# Patient Record
Sex: FEMALE | Race: WHITE | NOT HISPANIC OR LATINO | Employment: UNEMPLOYED | URBAN - METROPOLITAN AREA
[De-identification: names, ages, dates, MRNs, and addresses within clinical notes are randomized per-mention and may not be internally consistent; named-entity substitution may affect disease eponyms.]

---

## 2021-04-06 DIAGNOSIS — M79.672 LEFT FOOT PAIN: Primary | ICD-10-CM

## 2021-04-09 ENCOUNTER — OFFICE VISIT (OUTPATIENT)
Dept: PODIATRY | Age: 46
End: 2021-04-09
Attending: NURSE PRACTITIONER

## 2021-04-09 VITALS
HEIGHT: 67 IN | DIASTOLIC BLOOD PRESSURE: 70 MMHG | BODY MASS INDEX: 33.9 KG/M2 | WEIGHT: 216 LBS | HEART RATE: 70 BPM | SYSTOLIC BLOOD PRESSURE: 118 MMHG

## 2021-04-09 DIAGNOSIS — M79.5 FOREIGN BODY (FB) IN SOFT TISSUE: Primary | ICD-10-CM

## 2021-04-09 PROCEDURE — 99203 OFFICE O/P NEW LOW 30 MIN: CPT | Performed by: PODIATRIST

## 2021-04-09 PROCEDURE — 28190 REMOVAL OF FOOT FOREIGN BODY: CPT | Performed by: PODIATRIST

## 2021-04-09 RX ORDER — CAPSAICIN 0.025 %
CREAM (GRAM) TOPICAL PRN
COMMUNITY

## 2021-04-09 RX ORDER — TRAZODONE HYDROCHLORIDE 100 MG/1
100 TABLET ORAL
COMMUNITY
Start: 2020-12-21

## 2021-04-09 RX ORDER — ARIPIPRAZOLE 10 MG/1
10 TABLET ORAL DAILY
COMMUNITY
Start: 2020-12-21

## 2021-04-09 RX ORDER — SERTRALINE HYDROCHLORIDE 100 MG/1
150 TABLET, FILM COATED ORAL DAILY
COMMUNITY
Start: 2020-12-21

## 2021-04-09 RX ORDER — HYDROXYZINE 50 MG/1
50 TABLET, FILM COATED ORAL 3 TIMES DAILY PRN
COMMUNITY
Start: 2020-12-21

## 2021-04-09 RX ORDER — IBUPROFEN 600 MG/1
600 TABLET ORAL EVERY 6 HOURS PRN
COMMUNITY

## 2023-03-10 ENCOUNTER — HOSPITAL ENCOUNTER (INPATIENT)
Facility: HOSPITAL | Age: 48
LOS: 4 days | Discharge: NURSING, REHAB OR LONG-TERM CARE | End: 2023-03-14
Attending: INTERNAL MEDICINE | Admitting: STUDENT IN AN ORGANIZED HEALTH CARE EDUCATION/TRAINING PROGRAM
Payer: MEDICAID

## 2023-03-10 DIAGNOSIS — R45.851 SUICIDAL IDEATION: ICD-10-CM

## 2023-03-10 DIAGNOSIS — F33.2 SEVERE RECURRENT MAJOR DEPRESSION WITHOUT PSYCHOTIC FEATURES (H): Primary | ICD-10-CM

## 2023-03-10 LAB
ALBUMIN SERPL BCG-MCNC: 3.8 G/DL (ref 3.5–5.2)
ALP SERPL-CCNC: 111 U/L (ref 35–104)
ALT SERPL W P-5'-P-CCNC: 17 U/L (ref 10–35)
ANION GAP SERPL CALCULATED.3IONS-SCNC: 15 MMOL/L (ref 7–15)
AST SERPL W P-5'-P-CCNC: 21 U/L (ref 10–35)
BASOPHILS # BLD AUTO: 0.1 10E3/UL (ref 0–0.2)
BASOPHILS NFR BLD AUTO: 0 %
BILIRUB SERPL-MCNC: 0.2 MG/DL
BUN SERPL-MCNC: 18.7 MG/DL (ref 6–20)
CALCIUM SERPL-MCNC: 9.9 MG/DL (ref 8.6–10)
CHLORIDE SERPL-SCNC: 103 MMOL/L (ref 98–107)
CREAT SERPL-MCNC: 0.76 MG/DL (ref 0.51–0.95)
DEPRECATED HCO3 PLAS-SCNC: 21 MMOL/L (ref 22–29)
EOSINOPHIL # BLD AUTO: 0.4 10E3/UL (ref 0–0.7)
EOSINOPHIL NFR BLD AUTO: 3 %
ERYTHROCYTE [DISTWIDTH] IN BLOOD BY AUTOMATED COUNT: 12.7 % (ref 10–15)
ETHANOL SERPL-MCNC: <0.01 G/DL
GFR SERPL CREATININE-BSD FRML MDRD: >90 ML/MIN/1.73M2
GLUCOSE SERPL-MCNC: 103 MG/DL (ref 70–99)
HCT VFR BLD AUTO: 46.4 % (ref 35–47)
HGB BLD-MCNC: 15.5 G/DL (ref 11.7–15.7)
HOLD SPECIMEN: NORMAL
IMM GRANULOCYTES # BLD: 0.1 10E3/UL
IMM GRANULOCYTES NFR BLD: 0 %
LYMPHOCYTES # BLD AUTO: 3.4 10E3/UL (ref 0.8–5.3)
LYMPHOCYTES NFR BLD AUTO: 24 %
MCH RBC QN AUTO: 29.2 PG (ref 26.5–33)
MCHC RBC AUTO-ENTMCNC: 33.4 G/DL (ref 31.5–36.5)
MCV RBC AUTO: 88 FL (ref 78–100)
MONOCYTES # BLD AUTO: 1 10E3/UL (ref 0–1.3)
MONOCYTES NFR BLD AUTO: 7 %
NEUTROPHILS # BLD AUTO: 9.5 10E3/UL (ref 1.6–8.3)
NEUTROPHILS NFR BLD AUTO: 66 %
NRBC # BLD AUTO: 0 10E3/UL
NRBC BLD AUTO-RTO: 0 /100
PLATELET # BLD AUTO: 389 10E3/UL (ref 150–450)
POTASSIUM SERPL-SCNC: 3.9 MMOL/L (ref 3.4–5.3)
PROT SERPL-MCNC: 6.7 G/DL (ref 6.4–8.3)
RBC # BLD AUTO: 5.3 10E6/UL (ref 3.8–5.2)
SARS-COV-2 RNA RESP QL NAA+PROBE: NEGATIVE
SODIUM SERPL-SCNC: 139 MMOL/L (ref 136–145)
WBC # BLD AUTO: 14.4 10E3/UL (ref 4–11)

## 2023-03-10 PROCEDURE — 36415 COLL VENOUS BLD VENIPUNCTURE: CPT | Performed by: INTERNAL MEDICINE

## 2023-03-10 PROCEDURE — 99285 EMERGENCY DEPT VISIT HI MDM: CPT | Performed by: INTERNAL MEDICINE

## 2023-03-10 PROCEDURE — 82077 ASSAY SPEC XCP UR&BREATH IA: CPT | Performed by: INTERNAL MEDICINE

## 2023-03-10 PROCEDURE — C9803 HOPD COVID-19 SPEC COLLECT: HCPCS

## 2023-03-10 PROCEDURE — 80053 COMPREHEN METABOLIC PANEL: CPT | Performed by: INTERNAL MEDICINE

## 2023-03-10 PROCEDURE — U0005 INFEC AGEN DETEC AMPLI PROBE: HCPCS | Performed by: INTERNAL MEDICINE

## 2023-03-10 PROCEDURE — 85004 AUTOMATED DIFF WBC COUNT: CPT | Performed by: INTERNAL MEDICINE

## 2023-03-10 PROCEDURE — 99285 EMERGENCY DEPT VISIT HI MDM: CPT

## 2023-03-10 PROCEDURE — 124N000001 HC R&B MH

## 2023-03-10 RX ORDER — OLANZAPINE 10 MG/1
10 TABLET ORAL 3 TIMES DAILY PRN
Status: DISCONTINUED | OUTPATIENT
Start: 2023-03-10 | End: 2023-03-14 | Stop reason: HOSPADM

## 2023-03-10 RX ORDER — OLANZAPINE 10 MG/2ML
10 INJECTION, POWDER, FOR SOLUTION INTRAMUSCULAR 3 TIMES DAILY PRN
Status: DISCONTINUED | OUTPATIENT
Start: 2023-03-10 | End: 2023-03-14 | Stop reason: HOSPADM

## 2023-03-10 RX ORDER — ACETAMINOPHEN 325 MG/1
650 TABLET ORAL EVERY 4 HOURS PRN
Status: DISCONTINUED | OUTPATIENT
Start: 2023-03-10 | End: 2023-03-14 | Stop reason: HOSPADM

## 2023-03-10 RX ORDER — MAGNESIUM HYDROXIDE/ALUMINUM HYDROXICE/SIMETHICONE 120; 1200; 1200 MG/30ML; MG/30ML; MG/30ML
30 SUSPENSION ORAL EVERY 4 HOURS PRN
Status: DISCONTINUED | OUTPATIENT
Start: 2023-03-10 | End: 2023-03-14 | Stop reason: HOSPADM

## 2023-03-10 RX ORDER — HYDROXYZINE HYDROCHLORIDE 25 MG/1
25 TABLET, FILM COATED ORAL EVERY 4 HOURS PRN
Status: DISCONTINUED | OUTPATIENT
Start: 2023-03-10 | End: 2023-03-14 | Stop reason: HOSPADM

## 2023-03-10 ASSESSMENT — COLUMBIA-SUICIDE SEVERITY RATING SCALE - C-SSRS
2. HAVE YOU ACTUALLY HAD ANY THOUGHTS OF KILLING YOURSELF?: YES
1. IN THE PAST MONTH, HAVE YOU WISHED YOU WERE DEAD OR WISHED YOU COULD GO TO SLEEP AND NOT WAKE UP?: YES
REASONS FOR IDEATION PAST MONTH: COMPLETELY TO END OR STOP THE PAIN (YOU COULDN'T GO ON LIVING WITH THE PAIN OR HOW YOU WERE FEELING)
FIRST ATTEMPT DATE: 61362
2. HAVE YOU ACTUALLY HAD ANY THOUGHTS OF KILLING YOURSELF?: YES
3. HAVE YOU BEEN THINKING ABOUT HOW YOU MIGHT KILL YOURSELF?: YES
4. HAVE YOU HAD THESE THOUGHTS AND HAD SOME INTENTION OF ACTING ON THEM?: YES
6. HAVE YOU EVER DONE ANYTHING, STARTED TO DO ANYTHING, OR PREPARED TO DO ANYTHING TO END YOUR LIFE?: YES
TOTAL  NUMBER OF ACTUAL ATTEMPTS LIFETIME: 3
5. HAVE YOU STARTED TO WORK OUT OR WORKED OUT THE DETAILS OF HOW TO KILL YOURSELF? DO YOU INTEND TO CARRY OUT THIS PLAN?: YES
5. HAVE YOU STARTED TO WORK OUT OR WORKED OUT THE DETAILS OF HOW TO KILL YOURSELF? DO YOU INTEND TO CARRY OUT THIS PLAN?: YES
1. IN YOUR LIFETIME, HAVE YOU WISHED YOU WERE DEAD OR WISHED YOU COULD GO TO SLEEP AND NOT WAKE UP?: Y
4. HAVE YOU HAD THESE THOUGHTS AND HAD SOME INTENTION OF ACTING ON THEM?: YES
1. HAVE YOU WISHED YOU WERE DEAD OR WISHED YOU COULD GO TO SLEEP AND NOT WAKE UP?: YES
MOST RECENT DATE: 65714
ATTEMPT LIFETIME: YES
ATTEMPT PAST THREE MONTHS: NO

## 2023-03-10 ASSESSMENT — ACTIVITIES OF DAILY LIVING (ADL)
ADLS_ACUITY_SCORE: 35
WALKING_OR_CLIMBING_STAIRS_DIFFICULTY: NO
DIFFICULTY_EATING/SWALLOWING: NO
DRESSING/BATHING_DIFFICULTY: NO
CHANGE_IN_FUNCTIONAL_STATUS_SINCE_ONSET_OF_CURRENT_ILLNESS/INJURY: NO
WEAR_GLASSES_OR_BLIND: NO
DOING_ERRANDS_INDEPENDENTLY_DIFFICULTY: NO
TOILETING_ISSUES: NO
FALL_HISTORY_WITHIN_LAST_SIX_MONTHS: NO
ADLS_ACUITY_SCORE: 28
CONCENTRATING,_REMEMBERING_OR_MAKING_DECISIONS_DIFFICULTY: NO

## 2023-03-11 LAB
AMPHETAMINES UR QL: NOT DETECTED
BARBITURATES UR QL SCN: NOT DETECTED
BENZODIAZ UR QL SCN: NOT DETECTED
BUPRENORPHINE UR QL: NOT DETECTED
CANNABINOIDS UR QL: NOT DETECTED
COCAINE UR QL SCN: NOT DETECTED
D-METHAMPHET UR QL: DETECTED
METHADONE UR QL SCN: NOT DETECTED
OPIATES UR QL SCN: NOT DETECTED
OXYCODONE UR QL SCN: NOT DETECTED
PCP UR QL SCN: NOT DETECTED
PROPOXYPH UR QL: NOT DETECTED
TRICYCLICS UR QL SCN: NOT DETECTED

## 2023-03-11 PROCEDURE — 99223 1ST HOSP IP/OBS HIGH 75: CPT | Mod: AI | Performed by: PSYCHIATRY & NEUROLOGY

## 2023-03-11 PROCEDURE — 80306 DRUG TEST PRSMV INSTRMNT: CPT | Performed by: INTERNAL MEDICINE

## 2023-03-11 PROCEDURE — 124N000001 HC R&B MH

## 2023-03-11 RX ORDER — SUMATRIPTAN 50 MG/1
100 TABLET, FILM COATED ORAL
COMMUNITY
Start: 2022-06-26

## 2023-03-11 RX ORDER — METOPROLOL SUCCINATE 50 MG/1
50 TABLET, EXTENDED RELEASE ORAL DAILY
Status: DISCONTINUED | OUTPATIENT
Start: 2023-03-12 | End: 2023-03-14 | Stop reason: HOSPADM

## 2023-03-11 RX ORDER — ARIPIPRAZOLE 10 MG/1
10 TABLET ORAL DAILY
Status: ON HOLD | COMMUNITY
Start: 2022-06-26 | End: 2023-03-14

## 2023-03-11 RX ORDER — METOPROLOL SUCCINATE 50 MG/1
50 TABLET, EXTENDED RELEASE ORAL DAILY
Status: ON HOLD | COMMUNITY
End: 2023-03-14

## 2023-03-11 RX ORDER — TRIAMCINOLONE ACETONIDE 5 MG/G
OINTMENT TOPICAL 2 TIMES DAILY PRN
Status: ON HOLD | COMMUNITY
End: 2023-03-13

## 2023-03-11 ASSESSMENT — ENCOUNTER SYMPTOMS
BACK PAIN: 0
HEADACHES: 0
NECK STIFFNESS: 0
WHEEZING: 0
DIZZINESS: 0
DYSPHORIC MOOD: 1
SHORTNESS OF BREATH: 0
NECK PAIN: 0
DIAPHORESIS: 0
VOICE CHANGE: 0
CONFUSION: 0
DYSURIA: 0
VOMITING: 0
MYALGIAS: 0
ABDOMINAL DISTENTION: 0
ABDOMINAL PAIN: 0
NUMBNESS: 0
BLOOD IN STOOL: 0
HEMATURIA: 0
FEVER: 0
ANAL BLEEDING: 0
COLOR CHANGE: 0
DEPRESSED MOOD: 1
SLEEP DISTURBANCE: 1
PALPITATIONS: 0
CHILLS: 0
CHEST TIGHTNESS: 0
COUGH: 0
ARTHRALGIAS: 0
NAUSEA: 0
FLANK PAIN: 0
WOUND: 0
LIGHT-HEADEDNESS: 0

## 2023-03-11 ASSESSMENT — ACTIVITIES OF DAILY LIVING (ADL)
ADLS_ACUITY_SCORE: 28

## 2023-03-11 NOTE — PLAN OF CARE
"Lyssa MEMBRENO Jama  March 10, 2023  Plan of Care Hand-off Note     Patient Care Path: Inpatient Mental Health    Plan for Care:     Pt presents to ED following argument with brother.  Pt displaying erratic behavior including yelling through day and night, throwing items, talking incoherently and complaining of \"crazy voices\" in her head the past 6 days while staying at brother's home.  Pt living in [facility] in [place] and is a heavy meth user.  Reported last use was about 6 days ago.  Pt has been off of medication for 3 weeks.  Pt endorses current SI with plan and intent.   Pt is unwilling to share plan for suicide.  Guarded and irritable.  Unable or unwilling to engage in safety planning.  Pt with history of suicide attempts.  Pt recommended for inpatient hospitalization for safety and stabilization.  ER MD consulted and supports recommendation.  MD will refer to inpatient bed available at [facility].  Clinical Intake contacted by .      Critical Safety Issues: Endorsing current suicidal ideation with plan and uncertain intent.  Pt has hx of multiple suicide attempts via overdose.  Pt displaying erratic behavior over past week with family and may be experiencing internal stimuli.      Overview:  This patient is a child/adolescent: No    This patient has additional special visitor precautions: No    Legal Status: 70 HH expiring      Contacts:   Az (brother), [phone]: Contact      Psychiatry Consult:  Adult Psychiatry Consult requested related to agitation. Psychiatry IP Consult Order Placed: No      Updated Attending Provider regarding plan of care.    Yaa Cerda      "

## 2023-03-11 NOTE — CONSULTS
"Diagnostic Evaluation Consultation  Crisis Assessment    Patient was assessed: [facility]  Patient location: [place] ED  Was a release of information signed: No. Reason: pt declined      Referral Data and Chief Complaint  Lyssa Yun is a 48 year old, who uses she/her pronouns, and presents to the ED via police. Patient is referred to the ED by family/friends. Patient is presenting to the ED for the following concerns: suicidal ideation.  Pt presents to the ED after brother called 911.  Pt has been staying at brother's home since Sunday.  Pt's behavior has been erratic, last use of meth on weekend.  Pt screaming, yelling, complaining at times of \"crazy voices\" in her head.  Brother states pt will scream all night.  Has been stating she wants to be dead and today told him she was going to kill herself.  When pt said she was having friends bring her drugs, brother called 911.     Informed Consent and Assessment Methods     Patient is her own guardian. Writer met with patient and explained the crisis assessment process, including applicable information disclosures and limits to confidentiality, assessed understanding of the process, and obtained consent to proceed with the assessment. Patient was observed to be able to participate in the assessment as evidenced by responding to questions.. Assessment methods included conducting a formal interview with patient, review of medical records, collaboration with medical staff, and obtaining relevant collateral information from family and community providers when available..     Over the course of this crisis assessment offered validation, engaged patient in problem solving and disposition planning and worked with patient on safety and aftercare planning. Patient's response to interventions was agitated, guarded.     Summary of Patient Situation    Pt presents to the ED after brother called 911. Brother went to [place] where pt has been living out of her non-running van using " meth.  Most of pt's family is in [place] and siblings have attempted multiple times to intervene with pt and provide help.  Pt brought to brother's house on Sunday.  Pt was high on meth.  Brother believes pt's last use was on the weekend.  Pt's behavior has been erratic with screaming and throwing things, saying she has crazy voices in her head.   Pt will scream all night.  Pt repeatedly stating she wishes to be dead.      Pt is interviewed laying down on hospital bed.  She engages in eye contact for brief moments. Pt is oriented to day and month, person and place.  Pt is quickly agitated, yells and speaks sarcastically.  Pt admits to current suicidal ideation with a plan.  Pt refuses to state what her plan is.  Pt has been off of her medications for 3 weeks, stating her van is broken and she has no way of getting anywhere.  Pt endorses depressed mood, hopelessness and helplessness, feelings of worthlessness.   Denies hearing voices currently.   Pt sees 10 year old daughter once a week through supervised visitation.  Only reason pt states for continued living.  Pt suffered significant losses in past year and half including death of mother and girlfriend and suicide of fiance.  Pt reports she has tried treatment and been hospitalized and nothing works.      Brief Psychosocial History  Pt has been living in broken van that does not run, using methamphetamine.  Pt is homeless and does not work.  Pt graduated high school.  She does not have custody of her child and sees her 10 year old daughter once a week with supervision.  Family history is positive for drug and alcohol use in siblings.     Significant Clinical History  Pt has history of depression, anxiety and methamphetamine induced psychosis.  Pt reports hx of inpatient hospitalizations and chemical dependency treatment.  Pt has suicide attempts by drug overdose in 2009, 2016, 2019 and 12/2020.  Pt reports 13 attempts.   Review of records indicates hx of  "commitment through [facility] in [place] in 2021.        Collateral Information  The following information was received from Az whose relationship to the patient is brother. Information was obtained via phone. Their phone number is [phone] and they last had contact with patient on today.    What happened today: Continued days of erratic behavior, screaming and throwing things.  Pt threatening to kill herself \"all week\", last time was this afternoon.  Pt also saying she was going to have friends bring her drugs while staying at brother's house.  Brother called 911.      What is different about patient's functioning: Increased agitation, behavioral acting out    Concern about alcohol/drug use: Yes drug use    What do you think the patient needs: inpatient and chemical health treatment    Has patient made comments about wanting to kill themselves/others:  Yes all week - verbally and texting    If d/c is recommended, can they take part in safety/aftercare planning: Yes to some extent    Other information: family has attempted to help pt many times     Risk Assessment  [place] Suicide Severity Rating Scale Full Clinical Version: 3/10/23  Suicidal Ideation  1. Wish to be Dead (Lifetime): Yes  Wish to be Dead Description (Lifetime): y  1. Wish to be Dead (Past 1 Month): Yes  2. Non-Specific Active Suicidal Thoughts (Lifetime): Yes  2. Non-Specific Active Suicidal Thoughts (Past 1 Month): Yes  3. Active Suicidal Ideation with any Methods (Not Plan) Without Intent to Act (Lifetime): Yes  Active Suicidal Ideation with any Methods (Not Plan) Description (Lifetime): y  3. Active Suicidal Ideation with any Methods (Not Plan) Without Intent to Act (Past 1 Month): Yes  4. Active Suicidal Ideation with Some Intent to Act, Without Specific Plan (Lifetime): Yes  Active Suicidal Ideation with Some Intent to Act, Without Specific Plan Description (Lifetime): y  4. Active Suicidal Ideation with Some Intent to Act, Without " Specific Plan (Past 1 Month): Yes  5. Active Suicidal Ideation with Specific Plan and Intent (Lifetime): Yes  Active Suicidal Ideation with Specific Plan and Intent Description (Lifetime): overdose  5. Active Suicidal Ideation with Specific Plan and Intent (Past 1 Month): Yes  Intensity of Ideation  Description of Most Severe Ideation (Lifetime): overdose  Frequency (Lifetime): Daily or almost daily  Frequency (Past 1 Month): Daily or almost daily  Deterrents (Lifetime): Uncertain that deterrents stopped you  Deterrents (Past 1 Month): Deterrents probably stopped you  Reasons for Ideation (Past 1 Month): Completely to end or stop the pain (You couldn't go on living with the pain or how you were feeling)  Suicidal Behavior  Actual Attempt (Lifetime): Yes  Total Number of Actual Attempts (Lifetime): 3  Actual Attempt Description (Lifetime): overdose  Actual Attempt (Past 3 Months): No  Has subject engaged in non-suicidal self-injurious behavior? (Lifetime): No  C-SSRS Risk (Lifetime/Recent)  Calculated C-SSRS Risk Score (Lifetime/Recent): High Risk    [place] Suicide Severity Rating Scale Since Last Contact:     Validity of evaluation is impacted by presenting factors during interview:  Agitated, rushing through answers or refusing to answer, guarded.   Comments regarding subjective versus objective responses to [place] tool: Pt stating she is suicidal, collateral matches  Environmental or Psychosocial Events: legal issues such as DWI, DUI, lawsuit, CPS involvement, etc., suicide bereavement, loss of a loved one, challenging interpersonal relationships, helplessness/hopelessness, unstable housing, homelessness, excessive debt, poor finances and ongoing abuse of substances  Chronic Risk Factors: history of suicide attempts (multiple) and history of psychiatric hospitalization   Warning Signs: talking or writing about death, dying, or suicide, hopelessness and recent discharges from emergency department or inpatient  psychiatric care  Protective Factors: responsibilities and duties to others, including pets and children  Interpretation of Risk Scoring, Risk Mitigation Interventions and Safety Plan:  Pt is agitated and guarded with responses.  Pt admits to methamphetamine use and constant suicidal ideation.  Pt admits to current plan but declines to divulge.  It is unclear if pt is experiencing any auditory hallucinations.  Pt expressing hopelessness and helplessness.  Pt is off medication, not connected to providers, estranged by family and homeless.  It is recommendation of this clinician that pt admit to [facility] for safety and stabilization.  Pt is unable or unwilling to engage in safety planning at this time.  Once out of inpatient, pt should be assessed for chemical dependency treatment recommendations.    Does the patient have thoughts of harming others? Yes.  Does the patient have a specific victim in mind? Yes brother Do they have a plan? No Do they have intent? No Is this a duty to warn situation?  no  Pt has recently threatened brother, however, this was in response to brother not allowing pt to have drugs in his home.     Is the patient engaging in sexually inappropriate behavior?  no        Current Substance Abuse     Is there recent substance abuse? methamphetamine.  reports last use 6 days ago.     Was a urine drug screen or blood alcohol level obtained: Yes pending       Mental Status Exam     Affect: Labile   Appearance: Disheveled    Attention Span/Concentration: Attentive  Eye Contact: Variable   Fund of Knowledge: Appropriate    Language /Speech Content: Fluent   Language /Speech Volume: Loud    Language /Speech Rate/Productions: Pressured    Recent Memory: Variable   Remote Memory: Variable   Mood: Irritable    Orientation to Person: Yes    Orientation to Place: Yes   Orientation to Time of Day: No    Orientation to Date: Yes    Situation (Do they understand why they are here?): Yes    Psychomotor  Behavior: Agitated    Thought Content: Suicidal   Thought Form: Intact      History of commitment: Yes 2021     Medication    Psychotropic medications: No current medications but a history of Abilify, Hydroxyzine, Sertraline.  Medication changes made in the last two weeks: No       Current Care Team    Primary Care Provider: No  Psychiatrist: No  Therapist: No  : No     CTSS or ARMHS: No  ACT Team: No  Other: No      Diagnosis    296.30 (F33.9) Major Depressive Disorder, Recurrent Episode, Unspecified _  300.00 (F41.9) Unspecified Anxiety Disorder  Substance-Related & Addictive Disorders Stimulant Use Disorder:  The ICD-10-CM code indicates the comorbid presence of a moderate or severe substance use disorder, which must be present in ordre to apply the code for substance wtihdrawal  , Specify current severity:  Severe  304.40 (F15.20) Severe, Amphetamine type substance   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  Pt guarded and irritable with interview.  Pt admits to current suicidal ideation and notes it is constant.  Pt admits to plan and intent but will not disclose plan.  Pt expressing hopelessness and helplessness.  Pt unable to safety plan, is off medication and is homeless.   Pt currently deemed a risk to self and recommended for inpatient mental health for safety and stabilization.  ER MD consulted and supports recommendation.    Admission to Inpatient Level of Care is indicated due to:    1. Patient risk of severity of behavioral health disorder is appropriate to proposed level of care as indicated by:    Imminent Risk of Harm: Command auditory hallucinations or paranoid delusions contributing to  risk of suicide or self harm and Current plan for suicide or serious harm to self is present  And/or:  Behavioral health disorder is present and appropriate for inpatient care with both of the following:     Severe psychiatric, behavioral or other comorbid conditions are appropriate for management at inpatient mental health as indicated by at least one of the following:   o Comorbid substance use disorder    Severe dysfunction in daily living is present as indicated by at least one of the following:   o Complete inability to maintain any appropriate aspect of personal responsibility in any adult roles    2. Inpatient mental health services are necessary to meet patient needs and at least one of the following:  Specific condition related to admission diagnosis is present and judged likely to deteriorate in absence of treatment at proposed level of care    3. Situation and expectations are appropriate for inpatient care, as indicated by one of the following:   Patient management/treatment at lower level of care is not feasible or is inappropriate    Disposition    Recommended disposition: Inpatient Mental Health       Reviewed case and recommendations with attending provider. Attending Name: Dr Leonard       Attending concurs with disposition: Yes       Patient and/or validated legal guardian concurs with disposition: Yes       Final disposition: Inpatient mental health .     Inpatient Details (if applicable):   Is patient admitted voluntarily:No, 72 hr hold. Hold start date/time: 3/10/23      Patient aware of potential for transfer if there is not appropriate placement? Yes       Patient is willing to travel outside of the [facility] for placement? Yes      Behavioral Intake Notified? Yes: Date: 3/10/23 Time: 9:30 pm.       Assessment Details    Patient interview started at: 8:10p and completed at: 8:50p.     Total duration spent on the patient case in minutes: 1.0 hrs      CPT code(s) utilized: 57174 - Psychotherapy for  Crisis - 60 (30-74*) min       Yaa Cerda, ARACELI, [facility], Psychotherapist  [facility] - Triage & Transition Services  Callback: [phone]

## 2023-03-11 NOTE — ED TRIAGE NOTES
"Pt was brought in by PD. Pt told PD that \"it would be better if I wasn't here\". PD placed her on a 72 hr hold fore safety. Pt denies suicidal ideation, homicidal ideation, pain, or any acute distress.      Triage Assessment     Row Name 03/10/23 1925       Triage Assessment (Adult)    Airway WDL WDL       Respiratory WDL    Respiratory WDL WDL       Skin Circulation/Temperature WDL    Skin Circulation/Temperature WDL WDL       Cardiac WDL    Cardiac WDL WDL       Peripheral/Neurovascular WDL    Peripheral Neurovascular WDL WDL       Cognitive/Neuro/Behavioral WDL    Cognitive/Neuro/Behavioral WDL X    Level of Consciousness other (see comments)  possible suicidal ideation    Arousal Level opens eyes spontaneously    Orientation oriented x 4    Mood/Behavior flat affect       Ami Coma Scale    Best Eye Response 4-->(E4) spontaneous    Best Motor Response 6-->(M6) obeys commands    Best Verbal Response 5-->(V5) oriented    Ami Coma Scale Score 15              "

## 2023-03-11 NOTE — PROGRESS NOTES
03/10/23 0048   Patient Belongings   Did you bring any home meds/supplements to the hospital?  Yes   Patient Belongings locker   Patient Belongings Remaining with Patient shoes   Patient Belongings Put in Hospital Secure Location (Security or Locker, etc.) other (see comments)   Belongings Search No belongings   Clothing Search Yes   Second Staff Sharron   Comment flower print pants, blue top, gray bra, gray socks, brown boots, black jkt,     List items sent to safe:[place] drivers license, bracelet, forward health card, 2 net spend visa debit, [place] quest card, [employer] play card, 2 receipts.  All other belongings put in assigned cubby in belongings room.       I have reviewed my belongings list on admission and verify that it is correct.     Patient signature_______________________________    Second staff witness (if patient unable to sign) ______________________________       I have received all my belongings at discharge.    Patient signature________________________________    Lucas   3/10/2023  11:28 PM

## 2023-03-11 NOTE — PLAN OF CARE
"ADMISSION NOTE    Reason for admission SI  Safety concerns None  Risk for or history of violence None   Full skin assessment: clear.     Patient arrived on unit from [facility] ED accompanied by staff and security on 3/10/2023  10:45 PM.   Status on arrival: calm and alert.  /85   Pulse 66   Temp 98.6  F (37  C) (Temporal)   Resp 14   Ht 1.676 m (5' 6\")   Wt 116 kg (255 lb 11.2 oz)   SpO2 93%   BMI 41.27 kg/m    Patient given tour of unit and Welcome to  unit papers given to patient, wanding completed, belongings inventoried, and admission assessment completed.   Patient's legal status on arrival is voluntary. Appropriate legal rights discussed with and copy given to patient. Patient Bill of Rights discussed with and copy given to patient. Patient denies SI, HI, and thoughts of self harm and contracts for safety while on unit.        Pt denies SI/HI, AH/VH,and pain. Pt endorses anxiety and depression.  Pt is VSS except for SBP elevated.  Pt given snack and juice. Pt has a flat affect. Pt is calm and alert. Pt is using appropriate behavior with staff and peers.   Pt brought in by PD after she called PD. Pt was arguing with her brother and sending him texts that she would be \"better off not being here\". Pt is living out of her van that is at her brother's place.     Alicia Dejesus RN  3/10/2023              "

## 2023-03-11 NOTE — PLAN OF CARE
"Face to face shift report received from Ynes SANCHEZ. Rounding completed, pt observed laying in bed at the start of the shift.    Patient withdrawn to room all day laying in bed. Alert and making needs known. Ate 100% of breakfast and lunch. States she cannot come and get her lunch trays due to \"horrible social anxiety.\" Calm during conversation with this writer while dismissive at times. Compliant with nursing assessment. Denies anxiety, depression, hallucinations, SI/HI, and pain. States she isn't feeling well and reports feeling overheated. No other symptoms reported. Urinalysis completed and sent to lab. Patient heard from nurse's station yelling in her room while speaking with provider on the Ipad. When approached by nursing staff she stated \"Get this fucking thing out of here. I'm sick of them always saying the same thing.\" Directed patient to room in [facility] at this time with no problems. No further yelling or behaviors towards nursing staff. Offered pharmacologic intervention at this time and patient declined. Ate lunch and remained in bed for the rest of the afternoon.    Problem: Adult Behavioral Health Plan of Care  Goal: Patient-Specific Goal (Individualization)  Description: Patient will sleep 6 to 8 hours per night  Patient will eat at least 50% of meals  Patient will attend at least 50% of groups  Patient will comply with recommendations of treatment team  Patient will remain medication compliant      Outcome: Progressing     Problem: Suicide Risk  Goal: Absence of Self-Harm  Description: Pt will be free of self harm while on unit.   Outcome: Progressing    Face to face report will be communicated to oncrossana SANCHEZ.    Bessy Watts RN  3/11/2023                        "

## 2023-03-11 NOTE — PLAN OF CARE
Problem: Adult Behavioral Health Plan of Care  Goal: Patient-Specific Goal (Individualization)  Description: Patient will sleep 6 to 8 hours per night  Patient will eat at least 50% of meals  Patient will attend at least 50% of groups  Patient will comply with recommendations of treatment team  Patient will remain medication compliant  Outcome: Progressing     Problem: Suicide Risk  Goal: Absence of Self-Harm  Description: Pt will be free of self harm while on unit.   Outcome: Progressing     Face to face shift report received from Charisse Ocampo completed, pt observed laying in bed, appeared to be sleeping.    Patient appeared to be sleeping for approximately 7 hours since 2300.    Patient had no reported or observed suicidal behavior or self harm this shift.      Patient's morning vitals as follows; Blood Pressure 107/64  Temp 97 Pulse 65 Respirations 16 SpO2 96% RA    Face to face report will be communicated to oncoming RN.    Ynes Blackwell RN  3/11/2023  6:29 AM

## 2023-03-11 NOTE — ED NOTES
"Pt brought in by PD. Pt states she called the PD because she was arguing with her brother. Pt states that the brother has been receiving texts from pt that state she feels she would be \"better off not being here\". PD placed her on a 72 hr hold and she is in ED 08. Pt denies suicidal ideation or homicidal ideation. She states she feels bored and does not know where to go as the van she lives out of is at her brother house. Pt denies pain, SOB, chest pain, nausea or any acute distress. Pt has been notified of the hold and the plan to have her evaluated.   "

## 2023-03-11 NOTE — ED PROVIDER NOTES
History     Chief Complaint   Patient presents with     Mental Health Problem     The history is provided by the patient.   Mental Health Problem  Presenting symptoms: depression and suicidal thoughts    Degree of incapacity (severity):  Moderate  Onset quality:  Gradual  Timing:  Intermittent  Progression:  Worsening  Chronicity:  Recurrent  Associated symptoms: no abdominal pain, no chest pain and no headaches        Allergies:  Allergies   Allergen Reactions     Ciprofloxacin Hives     Morphine Hives     Hives and itching.     Sulfa Drugs Rash       Problem List:    Patient Active Problem List    Diagnosis Date Noted     Suicidal ideation 03/10/2023     Priority: Medium        Past Medical History:    No past medical history on file.    Past Surgical History:    No past surgical history on file.    Family History:    No family history on file.    Social History:  Marital Status:  Legally  [3]        Medications:    No current outpatient medications on file.        Review of Systems   Constitutional: Negative for chills, diaphoresis and fever.   HENT: Negative for voice change.    Eyes: Negative for visual disturbance.   Respiratory: Negative for cough, chest tightness, shortness of breath and wheezing.    Cardiovascular: Negative for chest pain, palpitations and leg swelling.   Gastrointestinal: Negative for abdominal distention, abdominal pain, anal bleeding, blood in stool, nausea and vomiting.   Genitourinary: Negative for decreased urine volume, dysuria, flank pain and hematuria.   Musculoskeletal: Negative for arthralgias, back pain, gait problem, myalgias, neck pain and neck stiffness.   Skin: Negative for color change, pallor, rash and wound.   Neurological: Negative for dizziness, syncope, light-headedness, numbness and headaches.   Psychiatric/Behavioral: Positive for behavioral problems, dysphoric mood, sleep disturbance and suicidal ideas. Negative for confusion.       Physical Exam   BP:  "(!) 156/116  Pulse: 88  Temp: 99.1  F (37.3  C)  Resp: 18  Height: 167.6 cm (5' 6\")  Weight: 104.3 kg (230 lb)  SpO2: 93 %      Physical Exam  Vitals and nursing note reviewed.   Constitutional:       Appearance: She is well-developed.   HENT:      Head: Normocephalic and atraumatic.      Mouth/Throat:      Pharynx: No oropharyngeal exudate.   Eyes:      Conjunctiva/sclera: Conjunctivae normal.      Pupils: Pupils are equal, round, and reactive to light.   Neck:      Thyroid: No thyromegaly.      Vascular: No JVD.      Trachea: No tracheal deviation.   Cardiovascular:      Rate and Rhythm: Normal rate and regular rhythm.      Heart sounds: Normal heart sounds. No murmur heard.    No friction rub. No gallop.   Pulmonary:      Effort: Pulmonary effort is normal. No respiratory distress.      Breath sounds: Normal breath sounds. No stridor. No wheezing or rales.   Chest:      Chest wall: No tenderness.   Abdominal:      General: Bowel sounds are normal. There is no distension.      Palpations: Abdomen is soft. There is no mass.      Tenderness: There is no abdominal tenderness. There is no guarding or rebound.   Musculoskeletal:         General: No tenderness. Normal range of motion.      Cervical back: Normal range of motion and neck supple.   Lymphadenopathy:      Cervical: No cervical adenopathy.   Skin:     General: Skin is warm and dry.      Coloration: Skin is not pale.      Findings: No erythema or rash.   Neurological:      Mental Status: She is alert and oriented to person, place, and time.   Psychiatric:         Attention and Perception: Attention normal.         Mood and Affect: Mood is depressed.         Behavior: Behavior is slowed.         Thought Content: Thought content includes suicidal ideation. Thought content does not include suicidal plan.         ED Course                 Procedures                Results for orders placed or performed during the hospital encounter of 03/10/23 (from the past 24 " hour(s))   Asymptomatic COVID-19 Virus (Coronavirus) by PCR Nasopharyngeal    Specimen: Nasopharyngeal; Swab   Result Value Ref Range    SARS CoV2 PCR Negative Negative    Narrative    Testing was performed using the [employer] Xpress SARS-CoV-2 Assay on the Cepheid Gene-Xpert Instrument Systems. Additional information about this Emergency Use Authorization (EUA) assay can be found via the Lab Guide. This test should be ordered for the detection of SARS-CoV-2 in individuals who meet SARS-CoV-2 clinical and/or epidemiological criteria as well as from individuals without symptoms or other reasons to suspect COVID-19. Test performance for asymptomatic patients has only been established in anterior nasal swab specimens. This test is for in vitro diagnostic use under the FDA EUA for laboratories certified under CLIA to perform high complexity testing. This test has not been FDA cleared or approved. A negative result does not rule out the presence of PCR inhibitors in the specimen or target RNA concentration below the limit of detection for the assay. The possibility of a false negative should be considered if the patient's recent exposure or clinical presentation suggests COVID-19. This test was validated by [facility] laboratory. This laboratory is certified under the Clinical Laboratory Improvement Amendments (CLIA) as qualified to perform high complexity testing.   Comprehensive metabolic panel   Result Value Ref Range    Sodium 139 136 - 145 mmol/L    Potassium 3.9 3.4 - 5.3 mmol/L    Chloride 103 98 - 107 mmol/L    Carbon Dioxide (CO2) 21 (L) 22 - 29 mmol/L    Anion Gap 15 7 - 15 mmol/L    Urea Nitrogen 18.7 6.0 - 20.0 mg/dL    Creatinine 0.76 0.51 - 0.95 mg/dL    Calcium 9.9 8.6 - 10.0 mg/dL    Glucose 103 (H) 70 - 99 mg/dL    Alkaline Phosphatase 111 (H) 35 - 104 U/L    AST 21 10 - 35 U/L    ALT 17 10 - 35 U/L    Protein Total 6.7 6.4 - 8.3 g/dL    Albumin 3.8 3.5 - 5.2 g/dL    Bilirubin Total 0.2  <=1.2 mg/dL    GFR Estimate >90 >60 mL/min/1.73m2   CBC with Platelets & Differential    Narrative    The following orders were created for panel order CBC with Platelets & Differential.  Procedure                               Abnormality         Status                     ---------                               -----------         ------                     CBC with platelets and d...[[id number]]  Abnormal            Final result                 Please view results for these tests on the individual orders.   Ethyl Alcohol Level   Result Value Ref Range    Alcohol ethyl <0.01 <=0.01 g/dL   CBC with platelets and differential   Result Value Ref Range    WBC Count 14.4 (H) 4.0 - 11.0 10e3/uL    RBC Count 5.30 (H) 3.80 - 5.20 10e6/uL    Hemoglobin 15.5 11.7 - 15.7 g/dL    Hematocrit 46.4 35.0 - 47.0 %    MCV 88 78 - 100 fL    MCH 29.2 26.5 - 33.0 pg    MCHC 33.4 31.5 - 36.5 g/dL    RDW 12.7 10.0 - 15.0 %    Platelet Count 389 150 - 450 10e3/uL    % Neutrophils 66 %    % Lymphocytes 24 %    % Monocytes 7 %    % Eosinophils 3 %    % Basophils 0 %    % Immature Granulocytes 0 %    NRBCs per 100 WBC 0 <1 /100    Absolute Neutrophils 9.5 (H) 1.6 - 8.3 10e3/uL    Absolute Lymphocytes 3.4 0.8 - 5.3 10e3/uL    Absolute Monocytes 1.0 0.0 - 1.3 10e3/uL    Absolute Eosinophils 0.4 0.0 - 0.7 10e3/uL    Absolute Basophils 0.1 0.0 - 0.2 10e3/uL    Absolute Immature Granulocytes 0.1 <=0.4 10e3/uL    Absolute NRBCs 0.0 10e3/uL   Extra Tube    Narrative    The following orders were created for panel order Extra Tube.  Procedure                               Abnormality         Status                     ---------                               -----------         ------                     Extra Blue Top Tube[[id number]]                              Final result               Extra Red Top Tube[[id number]]                               Final result               Extra Heparinized Syringe[[id number]]                        Final result                  Please view results for these tests on the individual orders.   Extra Blue Top Tube   Result Value Ref Range    Hold Specimen JIC    Extra Red Top Tube   Result Value Ref Range    Hold Specimen JIC    Extra Heparinized Syringe   Result Value Ref Range    Hold Specimen JIC        Medications   acetaminophen (TYLENOL) tablet 650 mg (has no administration in time range)   alum & mag hydroxide-simethicone (MAALOX) suspension 30 mL (has no administration in time range)   melatonin tablet 5 mg (has no administration in time range)   hydrOXYzine (ATARAX) tablet 25 mg (has no administration in time range)   OLANZapine (zyPREXA) tablet 10 mg (has no administration in time range)     Or   OLANZapine (zyPREXA) injection 10 mg (has no administration in time range)       Assessments & Plan (with Medical Decision Making)   Brought by PD for suicidal comments , as her brother reported  Admitted that she is Suicidal i, admitted to methamphatmine abuse too    DEC assessemtn appreciated  Labs reviewed  Spoke to Madelin  Provider , accepted for admission.   I have reviewed the nursing notes.    I have reviewed the findings, diagnosis, plan and need for follow up with the patient.  .        Current Discharge Medication List          Final diagnoses:   Suicidal ideation       3/10/2023   [facility]     Gregory Leonard MD  03/11/23 3656

## 2023-03-11 NOTE — H&P
"  [facility] PSYCH[facility]ATRY  -  HISTORY AND PHYSICAL     ADMISSION DATA     Lyssa Yun MRN# [id number]   Age: 48 year old YOB: 1975     Date of Admission: 3/10/2023  Primary Physician: No primary care provider on file.   Referral Area: Referral Area: [facility] Emergency Department       CHIEF COMPLAINT   Reason for Admit/Consult: Suicidal ideation or attempt / self harm and Psychosis / jamari symptoms       HISTORY OF PRESENT ILLNESS   Lyssa Yun is a 48 year old female with an unknown past psychiatric history. Appears to have received some of her care through [place] where she has received commitment in the past.  PTA medications indicate she may be on aripiprazole and fluoxetine.  She presents to our emergency department at the recommendation of her brother who she has briefly been living. Reportedly she has been abusing methamphetamine resulting in worsening auditory hallucinations and threatening to end her life. Patient was subsequently transferred and accepted for inpatient psychiatric hospitalization at [facility] for further safety and further stabilization.     Prior to seeing the patient, I had the opportunity to review the medical record. Per DEC assessment in emergency department, \"Lyssa Yun is a 48 year old, who uses she/her pronouns, and presents to the ED via police. Patient is referred to the ED by family/friends. Patient is presenting to the ED for the following concerns: suicidal ideation.  Pt presents to the ED after brother called 911.  Pt has been staying at brother's home since Sunday.  Pt's behavior has been erratic, last use of meth on weekend.  Pt screaming, yelling, complaining at times of \"crazy voices\" in her head.  Brother states pt will scream all night.  Has been stating she wants to be dead and today told him she was going to kill herself.  When pt said she was having friends bring her drugs, brother called 911. Pt " "presents to the ED after brother called 911. Brother went to [place] where pt has been living out of her non-running van using meth.  Most of pt's family is in [place] and siblings have attempted multiple times to intervene with pt and provide help.  Pt brought to brother's house on Sunday.  Pt was high on meth.  Brother believes pt's last use was on the weekend.  Pt's behavior has been erratic with screaming and throwing things, saying she has crazy voices in her head.   Pt will scream all night.  Pt repeatedly stating she wishes to be dead.       Pt is interviewed laying down on hospital bed.  She engages in eye contact for brief moments. Pt is oriented to day and month, person and place.  Pt is quickly agitated, yells and speaks sarcastically.  Pt admits to current suicidal ideation with a plan.  Pt refuses to state what her plan is.  Pt has been off of her medications for 3 weeks, stating her van is broken and she has no way of getting anywhere.  Pt endorses depressed mood, hopelessness and helplessness, feelings of worthlessness.   Denies hearing voices currently.   Pt sees 10 year old daughter once a week through supervised visitation.  Only reason pt states for continued living.  Pt suffered significant losses in past year and half including death of mother and girlfriend and suicide of fiance.  Pt reports she has tried treatment and been hospitalized and nothing works.  \"    On psychiatric interview, she is met within her room. She has little to no interest in communicating today. She has foul language and is swearing profusely. She denies methamphetamine use despite her UDS being positive for it. She states she used 6 days ago. She states she is \"tired\" and \"fuck off\".  She mention how her brother lies. She talks about how \"you all ask the same questions\". She begins yelling and throws the iPad on the floor. Interview terminated.        REVIEW OF PSYCHIATRIC SYSTEMS   Unable to      REVIEW OF MEDICAL " "SYSTEMS   14-point medical review of systems is negative other than noted in the HPI.     PSYCHIATRIC HISTORY   Unable to engage in conversation today, per DEC assessment, \"Pt has history of depression, anxiety and methamphetamine induced psychosis.  Pt reports hx of inpatient hospitalizations and chemical dependency treatment.  Pt has suicide attempts by drug overdose in 2009, 2016, 2019 and 12/2020.  Pt reports 13 attempts.   Review of records indicates hx of commitment through [facility] in [place] in 2021\"     FAMILY HISTORY   No family history on file.      SUBSTANCE USE HISTORY   History   Drug Use Not on file       Social History    Substance and Sexual Activity      Alcohol use: Not on file      History   Smoking Status     Not on file   Smokeless Tobacco     Not on file     UDS positive for methamphetamine       SOCIAL HISTORY   Unable to engage in conversation today. Per DEC assessment, \"Pt has been living in broken van that does not run, using methamphetamine.  Pt is homeless and does not work.  Pt graduated high school.  She does not have custody of her child and sees her 10 year old daughter once a week with supervision.  Family history is positive for drug and alcohol use in siblings. \"       PAST MEDICAL HISTORY   No past medical history on file.    No past surgical history on file.    Ciprofloxacin, Morphine, and Sulfa drugs     PHYSICAL EXAM   General: Awake and alert, NAD  HEENT: EOMI, no scleral icterus, no injection of conjunctivae, moist mucus membranes  Respiratory: Breathing comfortably   Extremities: No cyanosis, clubbing, or edema   Skin: No gross rash, no bruising  Neuro: CN II-XII intact, no focal deficits          VITALS   Vitals: /64 (BP Location: Right arm)   Pulse 65   Temp 97  F (36.1  C) (Temporal)   Resp 16   Ht 1.676 m (5' 6\")   Wt 116 kg (255 lb 11.2 oz)   SpO2 96%   BMI 41.27 kg/m       MENTAL STATUS EXAM   Appearance:  disheveled  Attitude:  uncooperative  Eye " "Contact:  poor   Mood:\"fuck off\"  Affect: irritable  Speech:  mumbling  Psychomotor Behavior:  physical agitation  Thought Process:  disorganized  Associations:  no loose associations  Thought Content:  unable to assess  Insight:  limited  Judgment:  limited  Oriented to: unable to assess  Attention Span and Concentration: impaired  Gait and Station: Normal       LABS   Recent Results (from the past 24 hour(s))   Asymptomatic COVID-19 Virus (Coronavirus) by PCR Nasopharyngeal    Collection Time: 03/10/23  8:53 PM    Specimen: Nasopharyngeal; Swab   Result Value Ref Range    SARS CoV2 PCR Negative Negative   Comprehensive metabolic panel    Collection Time: 03/10/23  9:03 PM   Result Value Ref Range    Sodium 139 136 - 145 mmol/L    Potassium 3.9 3.4 - 5.3 mmol/L    Chloride 103 98 - 107 mmol/L    Carbon Dioxide (CO2) 21 (L) 22 - 29 mmol/L    Anion Gap 15 7 - 15 mmol/L    Urea Nitrogen 18.7 6.0 - 20.0 mg/dL    Creatinine 0.76 0.51 - 0.95 mg/dL    Calcium 9.9 8.6 - 10.0 mg/dL    Glucose 103 (H) 70 - 99 mg/dL    Alkaline Phosphatase 111 (H) 35 - 104 U/L    AST 21 10 - 35 U/L    ALT 17 10 - 35 U/L    Protein Total 6.7 6.4 - 8.3 g/dL    Albumin 3.8 3.5 - 5.2 g/dL    Bilirubin Total 0.2 <=1.2 mg/dL    GFR Estimate >90 >60 mL/min/1.73m2   Ethyl Alcohol Level    Collection Time: 03/10/23  9:03 PM   Result Value Ref Range    Alcohol ethyl <0.01 <=0.01 g/dL   CBC with platelets and differential    Collection Time: 03/10/23  9:03 PM   Result Value Ref Range    WBC Count 14.4 (H) 4.0 - 11.0 10e3/uL    RBC Count 5.30 (H) 3.80 - 5.20 10e6/uL    Hemoglobin 15.5 11.7 - 15.7 g/dL    Hematocrit 46.4 35.0 - 47.0 %    MCV 88 78 - 100 fL    MCH 29.2 26.5 - 33.0 pg    MCHC 33.4 31.5 - 36.5 g/dL    RDW 12.7 10.0 - 15.0 %    Platelet Count 389 150 - 450 10e3/uL    % Neutrophils 66 %    % Lymphocytes 24 %    % Monocytes 7 %    % Eosinophils 3 %    % Basophils 0 %    % Immature Granulocytes 0 %    NRBCs per 100 WBC 0 <1 /100    Absolute " Neutrophils 9.5 (H) 1.6 - 8.3 10e3/uL    Absolute Lymphocytes 3.4 0.8 - 5.3 10e3/uL    Absolute Monocytes 1.0 0.0 - 1.3 10e3/uL    Absolute Eosinophils 0.4 0.0 - 0.7 10e3/uL    Absolute Basophils 0.1 0.0 - 0.2 10e3/uL    Absolute Immature Granulocytes 0.1 <=0.4 10e3/uL    Absolute NRBCs 0.0 10e3/uL   Extra Blue Top Tube    Collection Time: 03/10/23  9:03 PM   Result Value Ref Range    Hold Specimen JIC    Extra Red Top Tube    Collection Time: 03/10/23  9:03 PM   Result Value Ref Range    Hold Specimen [facility]    Extra Heparinized Syringe    Collection Time: 03/10/23  9:03 PM   Result Value Ref Range    Hold Specimen [facility]    Urine Drugs of Abuse Screen Panel 13    Collection Time: 03/11/23 10:56 AM   Result Value Ref Range    Cannabinoids (77-tby-8-carboxy-9-THC) Not Detected Not Detected, Indeterminate    Phencyclidine Not Detected Not Detected, Indeterminate    Cocaine (Benzoylecgonine) Not Detected Not Detected, Indeterminate    Methamphetamine (d-Methamphetamine) Detected (A) Not Detected, Indeterminate    Opiates (Morphine) Not Detected Not Detected, Indeterminate    Amphetamine (d-Amphetamine) Not Detected Not Detected, Indeterminate    Benzodiazepines (Nordiazepam) Not Detected Not Detected, Indeterminate    Tricyclic Antidepressants (Desipramine) Not Detected Not Detected, Indeterminate    Methadone Not Detected Not Detected, Indeterminate    Barbiturates (Butalbital) Not Detected Not Detected, Indeterminate    Oxycodone Not Detected Not Detected, Indeterminate    Propoxyphene (Norpropoxyphene) Not Detected Not Detected, Indeterminate    Buprenorphine Not Detected Not Detected, Indeterminate         ASSESSMENT   Summary: Lyssa Yun is a 48 year old female with an unknown past psychiatric history. Appears to have received some of her care through [place] where she has received commitment in the past.  PTA medications indicate she may be on aripiprazole and fluoxetine.  She presents to our emergency  department at the recommendation of her brother who she has briefly been living. Reportedly she has been abusing methamphetamine resulting in worsening auditory hallucinations and threatening to end her life. Patient was subsequently transferred and accepted for inpatient psychiatric hospitalization at [facility] for further safety and further stabilization.     She remains on an involuntary 72 hour hold. We will let her withdraw from methamphetamine. She will be moved to the MHICU for her safety and the safety of staff and peers on unit given her lability. She has a history of commitment.  Not able to clear her from a safety standpoint. She has a history of commitment and will need to assess whether or not this is the best avenue for her moving forward.        DIAGNOSTIC FORMULATION   #Unspecified Psychotic Disorder  #Methamphetamine Use Disorder, Severe  #Methamphetamine Withdrawal  #Medication Non-adherence  #Suicidal Ideation     PLAN   Admit patient to [facility], Location: Unit 5     Legal Status: Orders Placed This Encounter      Health Officer Authority to Detain (KASH)      Voluntary    Safety Assessment:    Behavioral Orders   Procedures     Code 1 - Restrict to Unit     Routine Programming     As clinically indicated     Status 15     Every 15 minutes.      Imminent risk of harm in a setting less restrictive than an inpatient psychiatric facility is determined to be medium to high.     PTA medications held:   -fluoxetine  -aripiprazole    PTA medications continued/changed:   -metoprolol XL 50 mg    New medications initiated:   -olanzapine PRN    Programming: Patient will be treated in a therapeutic milieu with appropriate individual and group therapies. Education will be provided on diagnoses, medications, and treatments.     Medical diagnoses:  Per medicine    Diagnostic studies and consultations:  No additional studies or tests recommended on  admission. \    Level of care required: Acute psychiatric hospitalization    Justification for hospitalization and estimated length of stay: reasons for hospitalization include potential safety risk to self or others within the last week, decreased functioning in outpatient setting and in the setting of no outpatient management, need for highly structured inpatient management for stabilization of psychiatric symptoms, need for psychiatric medication initiation and stabilization.  Estimated length of stay is 4-7 days.      Total time: 80 minutes       ATTESTATION    Virgil Salinas MD  [facility]   Psychiatry    Video Visit: Patient has given verbal consent for video visit?: Yes  Type of Service: video visit for mental health treatment  Reason for Video Visit: COVID-19 [place]  Originating Site (patient location): [facility]  Distant Site (provider location): Remote Location  Mode of Communication: Video Conference via [employer]  Time of Service: Date: March 11, 2023 , Start: 08:00 end: 09:00

## 2023-03-12 PROCEDURE — 250N000013 HC RX MED GY IP 250 OP 250 PS 637: Performed by: PSYCHIATRY & NEUROLOGY

## 2023-03-12 PROCEDURE — 124N000001 HC R&B MH

## 2023-03-12 PROCEDURE — 99232 SBSQ HOSP IP/OBS MODERATE 35: CPT | Mod: GT | Performed by: PSYCHIATRY & NEUROLOGY

## 2023-03-12 RX ORDER — ARIPIPRAZOLE 5 MG/1
5 TABLET ORAL DAILY
Status: DISCONTINUED | OUTPATIENT
Start: 2023-03-12 | End: 2023-03-13

## 2023-03-12 RX ADMIN — METOPROLOL SUCCINATE 50 MG: 50 TABLET, EXTENDED RELEASE ORAL at 08:12

## 2023-03-12 RX ADMIN — ARIPIPRAZOLE 5 MG: 5 TABLET ORAL at 09:32

## 2023-03-12 RX ADMIN — SERTRALINE HYDROCHLORIDE 50 MG: 50 TABLET ORAL at 09:32

## 2023-03-12 ASSESSMENT — ACTIVITIES OF DAILY LIVING (ADL)
ADLS_ACUITY_SCORE: 28

## 2023-03-12 NOTE — PLAN OF CARE
Problem: Adult Behavioral Health Plan of Care  Goal: Patient-Specific Goal (Individualization)  Description: Patient will sleep 6 to 8 hours per night  Patient will eat at least 50% of meals  Patient will attend at least 50% of groups  Patient will comply with recommendations of treatment team  Patient will remain medication compliant    03/11/2023 Pt in MHICU due to disorganized behaviors.    Outcome: Progressing     Problem: Suicide Risk  Goal: Absence of Self-Harm  Description: Pt will be free of self harm while on unit.   Outcome: Progressing     Face to face shift report received from Charisse Ocampo completed, pt observed laying in bed in MHICU, appeared to be sleeping.    Patient appeared to be sleeping for approximately 6.75 hours since 2130 last shift.    Patient had no reported or observed suicidal behavior or self harm this shift.      Patient's morning vitals as follows; Blood Pressure 110/70  Temp 97.4 Pulse 60 Respirations 18 SpO2 94% RA    Face to face report will be communicated to oncoming RN.    Ynes Blackwell RN  3/12/2023  6:27 AM

## 2023-03-12 NOTE — PLAN OF CARE
Problem: Adult Behavioral Health Plan of Care  Goal: Patient-Specific Goal (Individualization)  Description: Patient will sleep 6 to 8 hours per night  Patient will eat at least 50% of meals  Patient will attend at least 50% of groups  Patient will comply with recommendations of treatment team  Patient will remain medication compliant    03/11/2023 Pt in MHICU due to disorganized behaviors.    Outcome: Progressing     Problem: Suicide Risk  Goal: Absence of Self-Harm  Description: Pt will be free of self harm while on unit.   Outcome: Progressing     Pt denies SI/HI, AH/VH, pain, anxiety, depression. Pt is medication compliant and VSS. Pt ate 100% of dinner. Pt has a flat affect. Pt is calm and alert. Pt is using appropriate behavior with staff and peers. Pt spent most of shift in her bed.     Face to face report will be communicated to oncoming RN.    Alicia Dejesus RN  3/11/2023

## 2023-03-12 NOTE — PLAN OF CARE
Face to face shift report received from Ynes SANCHEZ. Rounding completed, pt observed sleeping at the start of the shift.    Patient withdrawn to room all day laying in bed. Not weaning or attending group at this time. Alert and making needs known. Pleasant during conversation with this writer with flat affect. Compliant with scheduled medications and nursing assessment. Denies anxiety, depression, hallucinations, SI/HI, and pain. Ate 75% of breakfast and lunch. Remained in bed the rest of the afternoon stating she is tired today. No outbursts or yelling this shift.    Problem: Adult Behavioral Health Plan of Care  Goal: Patient-Specific Goal (Individualization)  Description: Patient will sleep 6 to 8 hours per night  Patient will eat at least 50% of meals  Patient will attend at least 50% of groups  Patient will comply with recommendations of treatment team  Patient will remain medication compliant    03/12/2023 Pt in MHICU due to disorganized behaviors.    Outcome: Progressing     Problem: Suicide Risk  Goal: Absence of Self-Harm  Description: Pt will be free of self harm while on unit.   Outcome: Progressing     Face to face report will be communicated to oncrossana RN.    Bessy Watts RN  3/12/2023

## 2023-03-12 NOTE — PLAN OF CARE
Problem: Adult Behavioral Health Plan of Care  Goal: Patient-Specific Goal (Individualization)  Description: Patient will sleep 6 to 8 hours per night  Patient will eat at least 50% of meals  Patient will attend at least 50% of groups  Patient will comply with recommendations of treatment team  Patient will remain medication compliant    03/12/2023 Pt in MHICU due to disorganized behaviors.    Outcome: Progressing     Problem: Suicide Risk  Goal: Absence of Self-Harm  Description: Pt will be free of self harm while on unit.   Outcome: Progressing     Pt denies SI/HI, AH/VH, pain, anxiety, depression. Pt is medication compliant and VSS. Pt ate 100% of dinner. Pt has a flat affect. Pt is calm and alert. Pt is using appropriate behavior with staff and peers. Pt spent most of shift sleeping or in bed.     Face to face report will be communicated to oncoming RN.    Alicia Dejesus RN  3/12/2023

## 2023-03-12 NOTE — PROGRESS NOTES
"  [facility] PSYCHIATRY  -  PROGRESS NOTE     ID   Name: Lyssa Yun  MRN#: [id number]     SUBJECTIVE   Prior to interviewing the patient, I met with nursing and reviewed patient's clinical condition. We discussed clinical care both before and after the interview. I have reviewed the patient's clinical course by review of records including previous notes, labs, and vital signs.     Per nursing, the patient had the following behavioral events over the last 24-hours: isolative to her room within MHICU    On psychiatric interview, she is met within the MHICU. She is sleeping and able to engage briefly. She states she has not been adherent to her psychotropic medications of aripiprazole and sertraline and would like to restart these. She states she is having significant stress in the form of homelessness and not being able to see her daughter, reportedly has supervised visits in [place]. She is linear in thought. No psychosis evident today. She does not vocalize any safety concerns. She reports she is \"tired'.        MEDICATIONS   Scheduled Meds:    ARIPiprazole  5 mg Oral Daily     metoprolol succinate ER  50 mg Oral Daily     sertraline  50 mg Oral Daily     PRN Meds:.acetaminophen, alum & mag hydroxide-simethicone, hydrOXYzine, melatonin, OLANZapine **OR** OLANZapine     ALLERGIES   Allergies   Allergen Reactions     Ciprofloxacin Hives     Morphine Hives     Hives and itching.     Sulfa Drugs Rash        VITALS   Vitals: /70 (BP Location: Right arm)   Pulse 64   Temp 97.4  F (36.3  C) (Temporal)   Resp 18   Ht 1.676 m (5' 6\")   Wt 116 kg (255 lb 11.2 oz)   SpO2 94%   BMI 41.27 kg/m       MENTAL STATUS EXAM   Lyssa Yun is an age appearing 48 year old female.  Grooming and hygiene are disheveled. tatttoos noted. kinetics are calm, poor eye contact. Superficially engaged in conversations. Concentration is intact to conversation, cognition and intellectual functioning are intact. Mood is " "\"tired\". Affect is restricted. Speech is within normal limits for volume, rate and tone.Thought process is logical, linear, and goal-directed. Does not endorse auditory/visual hallucinations and/or delusions. Does not appear to respond to internal stimuli.  Judgement and insight are impaired.  Does not endorse suicidal ideation.  Does not endorse homicidal ideation.        LABS   Recent Results (from the past 24 hour(s))   Urine Drugs of Abuse Screen Panel 13    Collection Time: 03/11/23 10:56 AM   Result Value Ref Range    Cannabinoids (14-cww-4-carboxy-9-THC) Not Detected Not Detected, Indeterminate    Phencyclidine Not Detected Not Detected, Indeterminate    Cocaine (Benzoylecgonine) Not Detected Not Detected, Indeterminate    Methamphetamine (d-Methamphetamine) Detected (A) Not Detected, Indeterminate    Opiates (Morphine) Not Detected Not Detected, Indeterminate    Amphetamine (d-Amphetamine) Not Detected Not Detected, Indeterminate    Benzodiazepines (Nordiazepam) Not Detected Not Detected, Indeterminate    Tricyclic Antidepressants (Desipramine) Not Detected Not Detected, Indeterminate    Methadone Not Detected Not Detected, Indeterminate    Barbiturates (Butalbital) Not Detected Not Detected, Indeterminate    Oxycodone Not Detected Not Detected, Indeterminate    Propoxyphene (Norpropoxyphene) Not Detected Not Detected, Indeterminate    Buprenorphine Not Detected Not Detected, Indeterminate         ASSESSMENT   Lyssa Yun is a 48 year old female with an unknown past psychiatric history. Appears to have received some of her care through [place] where she has received commitment in the past.  PTA medications indicate she may be on aripiprazole and fluoxetine.  She presents to our emergency department at the recommendation of her brother who she has briefly been living. Reportedly she has been abusing methamphetamine resulting in worsening auditory hallucinations and threatening to end her life. Patient " was subsequently transferred and accepted for inpatient psychiatric hospitalization at [facility] for further safety and further stabilization.     Daily Progress: calmer today. Less irritability and able to engage in a back and forth conversation today. Willing to restart her psychotropic medications of aripiprazole and sertraline as noted below. Has not been adherent to them in outpatient setting secondary to methamphetamine abuse.        DIAGNOSTIC FORMULATION   #Unspecified Psychotic Disorder  #Methamphetamine Use Disorder, Severe  #Methamphetamine Withdrawal  #Medication Non-adherence  #Suicidal Ideation     PLAN     Location: Unit 5  Legal Status: Orders Placed This [facility]      Health Officer Authority to Detain (KASH)      Voluntary    Safety Assessment:    Behavioral Orders   Procedures     Code 1 - Restrict to Unit     Routine Programming     As clinically indicated     Status 15     Every 15 minutes.          PTA medications continued/changed:   -aripiprazole - re-introduced at 5 mg q daily (3/12/23) as she had not been taking 10 mg consistently in outpatient setting  -sertraline - re-introduced at 50 mg q daily (3/12/23) as she had not been taking consistently in outpatient setting    New medications tried and stopped:   -None    New medications initiated:   -none    Today's Changes:  - re-introduce aripiprazole at 5 mg q daily and sertraline at 50 mg q daily    Programming: Patient will be treated in a therapeutic milieu with appropriate individual and group therapies. Education will be provided on diagnoses, medications, and treatments. Encouraged behavioral activation and participation in group programming.     Medical diagnoses:  Per medicine    Disposition: pending clinical course        TREATMENT TEAM CARE PLAN     Progress: Continued symptoms.    Continued Stay Criteria/Rationale: Continued symptoms without sufficient  improvement/resolution.    Medical/Physical: See above.    Precautions: See above.     Plan: Continue inpatient care with unit support and medication management.    Rationale for change in precautions or plan: NA due to no change.    Participants: Virgil Salinas MD, Nursing, SW, OT.    The patient's care was discussed with the treatment team and chart notes were reviewed.       ATTESTATION    Virgil Salinas MD  [facility]   Psychiatry    Video Visit: Patient has given verbal consent for video visit?: Yes  Type of Service: video visit for mental health treatment  Reason for Video Visit: COVID-19 [place]  Originating Site (patient location): [facility]  Distant Site (provider location): Remote Location  Mode of Communication: Video Conference via [employer]  Time of Service: Date: March 12, 2023 , Start:09:30 end: 10:00

## 2023-03-13 PROCEDURE — 250N000013 HC RX MED GY IP 250 OP 250 PS 637: Performed by: NURSE PRACTITIONER

## 2023-03-13 PROCEDURE — 124N000001 HC R&B MH

## 2023-03-13 PROCEDURE — 250N000013 HC RX MED GY IP 250 OP 250 PS 637: Performed by: PSYCHIATRY & NEUROLOGY

## 2023-03-13 PROCEDURE — 99232 SBSQ HOSP IP/OBS MODERATE 35: CPT | Mod: GT | Performed by: PSYCHIATRY & NEUROLOGY

## 2023-03-13 RX ORDER — HYDROXYZINE HYDROCHLORIDE 50 MG/1
50 TABLET, FILM COATED ORAL EVERY 6 HOURS PRN
Status: ON HOLD | COMMUNITY
End: 2023-03-13

## 2023-03-13 RX ORDER — ARIPIPRAZOLE 10 MG/1
10 TABLET ORAL DAILY
Status: DISCONTINUED | OUTPATIENT
Start: 2023-03-14 | End: 2023-03-14 | Stop reason: HOSPADM

## 2023-03-13 RX ORDER — TRIAMCINOLONE ACETONIDE 5 MG/G
CREAM TOPICAL 2 TIMES DAILY PRN
COMMUNITY

## 2023-03-13 RX ADMIN — HYDROXYZINE HYDROCHLORIDE 25 MG: 25 TABLET, FILM COATED ORAL at 11:06

## 2023-03-13 RX ADMIN — METOPROLOL SUCCINATE 50 MG: 50 TABLET, EXTENDED RELEASE ORAL at 08:11

## 2023-03-13 RX ADMIN — OLANZAPINE 10 MG: 10 TABLET, FILM COATED ORAL at 11:04

## 2023-03-13 RX ADMIN — ARIPIPRAZOLE 5 MG: 5 TABLET ORAL at 08:11

## 2023-03-13 RX ADMIN — SERTRALINE HYDROCHLORIDE 50 MG: 50 TABLET ORAL at 08:11

## 2023-03-13 ASSESSMENT — ACTIVITIES OF DAILY LIVING (ADL)
ADLS_ACUITY_SCORE: 28
LAUNDRY: UNABLE TO COMPLETE
ORAL_HYGIENE: INDEPENDENT
DRESS: SCRUBS (BEHAVIORAL HEALTH)
ADLS_ACUITY_SCORE: 28
ADLS_ACUITY_SCORE: 28
HYGIENE/GROOMING: INDEPENDENT
ADLS_ACUITY_SCORE: 28
DRESS: SCRUBS (BEHAVIORAL HEALTH);INDEPENDENT
ADLS_ACUITY_SCORE: 28
HYGIENE/GROOMING: INDEPENDENT

## 2023-03-13 NOTE — PLAN OF CARE
"Social Service Psychosocial Assessment    Presenting Problem: brought in by PD. Pt states she called the PD because she was arguing with her brother. Pt states that the brother has been receiving texts from pt that state she feels she would be \"better off not being here\".     Marital Status: Single     Spouse / Children: 10 yr old daughter she sees once a week through supervision.     Psychiatric TX HX: Has been committed in the past in [facility], last know was 2021.     Suicide Risk Assessment: Hx of 16 suicide attempts, Present to ED with SI, Denies SI at time of assessment.     Access to [employer] (explain): denies     Family Psych HX: Denies       A & Ox: x4      Medication Adherence: See H&P    Medical Issues: See H&P      Visual -Motor Functioning: Good    Communication Skills /Needs: Good    Ethnicity: White      Spirituality/Baptist Affiliation: None     Clergy Request: No      History: None reported      Living Situation: Homeless. Been living in her van that is broken.      ADL s: Independent       Education: GED    Financial Situation: Unemployed no income, receives SNAP benefits.      Occupation: Unemployed      Leisure & Recreation: See daughter    Childhood History: Grew up in [place], doesn't get along with family. Has siblings.      Trauma Abuse HX: Denies     Relationship / Sexuality: Straight     Substance Use/ Abuse: Methamphetamine at time of admit. Hx of Meth use, overdose in 2009, 2016, 2020.     Chemical Dependency Treatment HX:     Legal Issues: Denies     Significant Life Events: None stated     Strengths: Ability to communicate needs, in a safe environment    Challenges /Limitation: Poor coping skills, current mental health symptoms    Patient Support Contact (Include name, relationship, number, and summary of conversation):      Interventions:           Community-Based Programs- Would benefit from supports       Medical/Dental Care- PCP Wally Viveros "       CD Evaluation/Rule 25/Aftercare- Would benefit       Medication Management- Set up apt, pt was interested       Individual Therapy- Would benefit. Discuss again with pt later when more awake      Housing/Placement- Homeless, Will look at shelters to discharge to. Lives in her van now.       Case Management- Would benefit.       Insurance Coverage-  [place] [place]      Financial Assistance- SNAP     Suicide Risk Assessment- Hx of 16 suicide attempts, Present to ED with SI, Denies SI at time of assessment.       High Risk Safety Plan- Talk to supports; Call crisis lines; Go to local ER if feeling suicidal.    AYAH Platt  3/13/2023  10:49 AM

## 2023-03-13 NOTE — PLAN OF CARE
"  Problem: Adult Behavioral Health Plan of Care  Goal: Patient-Specific Goal (Individualization)  Description: Patient will sleep 6 to 8 hours per night  Patient will eat at least 50% of meals  Patient will attend at least 50% of groups  Patient will comply with recommendations of treatment team  Patient will remain medication compliant    03/13/2023 Patient may wean if behavior is appropriate.  Treatment Team to reassess daily.  Outcome: Progressing    A&O, VSS, denies pain. Pt sleeping this morning and wakes to name for breakfast. Tense -becomes irritable with nursing assessment. \"You are all making me worse--increasing my depression and anxiety \"My hold is over and I'm done with this!\"  Pt agreeable at this time to take prn medication-pt stats she will take Hydroxyzine-25 mg rec'd.   Discuss restatring pta meds-Abilify and Zoloft while she is here. Pt is agreeable and states \"good idea\".    Pt able to calm down and this writer encourages her to come to open unit. Pt declines-\"I don't want to see nobody.\"  Lets needs be known.     Problem: Suicide Risk  Goal: Absence of Self-Harm  Description: Pt will be free of self harm while on unit.   Outcome: Progressing   Goal Outcome Evaluation:    Plan of Care Reviewed With: patient                   "

## 2023-03-13 NOTE — PROGRESS NOTES
"  [facility] PSYCHIATRY  -  PROGRESS NOTE     ID   Name: Lyssa Yun  MRN#: [id number]     SUBJECTIVE   Prior to interviewing the patient, I met with nursing and reviewed patient's clinical condition. We discussed clinical care both before and after the interview. I have reviewed the patient's clinical course by review of records including previous notes, labs, and vital signs.     Per nursing, the patient had the following behavioral events over the last 24-hours: isolative to her room within MHICU. Declines ability to wean    On psychiatric interview, she is met within the MHICU. Does not wish to wake up for interview. Finds line of questioning \"ridiculous\". Rolls over and does not engage. Similar reaction to SW and nursing.        MEDICATIONS   Scheduled Meds:    ARIPiprazole  5 mg Oral Daily     metoprolol succinate ER  50 mg Oral Daily     sertraline  50 mg Oral Daily     PRN Meds:.acetaminophen, alum & mag hydroxide-simethicone, hydrOXYzine, melatonin, OLANZapine **OR** OLANZapine     ALLERGIES   Allergies   Allergen Reactions     Tylenol With Codeine #3 [Acetaminophen-Codeine] Hives     Ciprofloxacin Hives     Morphine Hives     Hives and itching.     Nitrofurantoin Rash     Sulfa Drugs Hives        VITALS   Vitals: /77 (BP Location: Right arm)   Pulse 67   Temp 96.8  F (36  C) (Temporal)   Resp 16   Ht 1.676 m (5' 6\")   Wt 116 kg (255 lb 11.2 oz)   SpO2 97%   BMI 41.27 kg/m       MENTAL STATUS EXAM   Lyssa Yun is an age appearing 48 year old female.  Grooming and hygiene are disheveled. tatttoos noted. kinetics are calm, poor eye contact. Superficially engaged in conversations. Concentration is intact to conversation, cognition and intellectual functioning are intact. Mood is \"fine\". Affect is irritable. Speech is within normal limits for volume, rate and tone.Thought process is logical, linear, and goal-directed. Does not endorse auditory/visual hallucinations and/or delusions. Does " not appear to respond to internal stimuli.  Judgement and insight are impaired.  Does not endorse suicidal ideation.  Does not endorse homicidal ideation.        LABS   No results found for this or any previous visit (from the past 24 hour(s)).      ASSESSMENT   Lyssa Yun is a 48 year old female with an unknown past psychiatric history. Appears to have received some of her care through [place] where she has received commitment in the past.  PTA medications indicate she may be on aripiprazole and fluoxetine.  She presents to our emergency department at the recommendation of her brother who she has briefly been living. Reportedly she has been abusing methamphetamine resulting in worsening auditory hallucinations and threatening to end her life. Patient was subsequently transferred and accepted for inpatient psychiatric hospitalization at [facility] for further safety and further stabilization.     Daily Progress: irritable. Non participatory.  Will increase her medications to near home dosing tomorrow. Suspect she will discharge to homeless shelter in coming days. Will need to complete a safety assessment prior to dismissal.      DIAGNOSTIC FORMULATION   #Unspecified Psychotic Disorder  #Methamphetamine Use Disorder, Severe  #Methamphetamine Withdrawal  #Medication Non-adherence  #Suicidal Ideation     PLAN     Location: Unit 5  Legal Status: Orders Placed This Encounter      Health Officer Authority to Detain (KASH)      Voluntary    Safety Assessment:    Behavioral Orders   Procedures     Code 1 - Restrict to Unit     Routine Programming     As clinically indicated     Status 15     Every 15 minutes.          PTA medications continued/changed:   -aripiprazole - re-introduced at 5 mg q daily (3/12/23) as she had not been taking 10 mg consistently in outpatient setting  -sertraline - re-introduced at 50 mg q daily (3/12/23) as she had not been taking consistently in  outpatient setting    New medications tried and stopped:   -None    New medications initiated:   -none    Today's Changes:  -increase aripiprazole to 10 mg q daily starting tomorrow (3/14/23)  -increase sertraline to 75 mg q daily starting tomorrow (3/14/23)    Programming: Patient will be treated in a therapeutic milieu with appropriate individual and group therapies. Education will be provided on diagnoses, medications, and treatments. Encouraged behavioral activation and participation in group programming.     Medical diagnoses:  Per medicine    Disposition: pending clinical course        TREATMENT TEAM CARE PLAN     Progress: Continued symptoms.    Continued Stay Criteria/Rationale: Continued symptoms without sufficient improvement/resolution.    Medical/Physical: See above.    Precautions: See above.     Plan: Continue inpatient care with unit support and medication management.    Rationale for change in precautions or plan: NA due to no change.    Participants: Virgil Salinas MD, Nursing, SW, OT.    The patient's care was discussed with the treatment team and chart notes were reviewed.       ATTESTATION    Virgil Salinas MD  [facility]   Psychiatry    Video Visit: Patient has given verbal consent for video visit?: Yes  Type of Service: video visit for mental health treatment  Reason for Video Visit: COVID-19 [place]  Originating Site (patient location): [facility]  Distant Site (provider location): Remote Location  Mode of Communication: Video Conference via [employer]  Time of Service: Date: March 12, 2023 , Start:09:30 end: 10:00

## 2023-03-13 NOTE — CARE PLAN
Prior to Admission Medication Reconciliation:     Medications added:   [x] None  [] As listed below:    Medications deleted:   [x] None  [] As listed below:    Medications marked for review/removal by attending:  [x] None  [] As listed below:    Changes made to existing medications:   [x] None  [] Updated time of day, strengths and frequencies to most current.     Pertinent notes/medications patient takes different than prescribed:     Non-compliant. Providers aware.     Last times/dates taken verified with patient:  [] Yes- completed myself   [x] Nurse completed, no changes made (double checked entries)  [] Unable to review with patient at this time:    Allergy review:    [x]Did not review: reviewed by nursing  []Allergies reviewed and updated if needed.     Medication reconciliation sources:   [x]Patient  []Patient family member/emergency contact: **  [][facility] Report Review  [x]Epic Chart Review  []Care Everywhere review  [x]Pharmacy med list/phone call: Can    Metoprolol/aristocort filled January 2023    Abilify/sertraline Nov 2022  []Fill dates reflect compliancy. No concerns.  []Fill dates do not reflect compliancy on the following medications:   [x]Outside meds dispense report:   []Fill dates reflect compliancy. No concerns.  []Fill dates do not reflect compliancy on the following medications:   []HomeCare medlist, Nursing home or Assisted Living MAR:  []Behavioral Health Provider:      [x]Other: [facility]    Last seen January 6, 2023    triamcinolone 0.5% AAA BID PRN    fluocinolone 0.1% oil     imitrex 100 mg at onset x 1 dose PRN     Senna 8.6 mg daily     Metoprolol succinate 50 mg daily     sertraline 50 mg 3 tabs daily     abilify 10 mg daily     ibu 600 mg q6h prn     Pharmacy desired at discharge: Can    Is patient on coumadin?   [x]No  []Yes      Fill dates and reported compliancy:  [x] Non-compliant: fill dates reflect reported compliancy.   [] Not applicable.  Patient is not taking any prescribed maintenance medications at this time.   [] Fill dates do not coincide with compliancy, but reports compliancy.    [] Fill dates reflect compliancy, but reports non-compliancy.   [] Cannot assess at this time.     Historian accuracy:  [] Excellent- alert and oriented, understands why meds were prescribed and how to take, able to answer specifics  [x] Good- alert and oriented, understands why meds were prescribed and how to take, some confusion   [] Fair- alert and oriented, doesn't know medications without list, cannot answer specifics about medications, but has a decent process for which to take at home  [] Poor- does not know medications, may not have a process to take at home, may be cognitively unable to review at this time  []Medication management done by family member or facility, no concerns about historian accuracy.   [] Cannot assess at this time.     Medication Management:  [x] Manages meds independently  [] Family member/ other party manages meds/assists:  [] Meds managed by staff at facility  [] Meds set up by home care, family/other party helps administer  [] Meds set up by home care, self administers  [] Cannot assess at this time.     Other medications aside from PTA:  [x] Denies taking any other medications aside from those listed in PTA meds, this includes over-the-counter vitamins, supplements and analgesics.   [] Unable to confirm at this time.     Iveth Lorenzana on 3/13/2023 at 9:57 AM     Notifying appropriate party of changes/additions/discrepancies:  [x]No pertinent changes made, notification not necessary.   [] Notified attending provider via text page/phone call/sticky note or other:  [] Notified other:  [] Medications have not been reconciled by a provider yet, notification not necessary  [] Pt is not admitted to floor yet or patient is boarding, PTA meds completed before admission.       Medications Prior to Admission   Medication Sig Dispense Refill Last  Dose     ARIPiprazole (ABILIFY) 10 MG tablet Take 10 mg by mouth daily   More than a month     metoprolol succinate ER (TOPROL XL) 50 MG 24 hr tablet Take 50 mg by mouth daily   Past Month     sertraline (ZOLOFT) 50 MG tablet Take 150 mg by mouth daily   More than a month     SUMAtriptan (IMITREX) 50 MG tablet Take 100 mg by mouth at onset of headache for migraine -Dose may be repeated after 2 hours. Do not exceed 200 mg in 24 hours   Past Month     triamcinolone (ARISTOCORT HP) 0.5 % external cream Apply topically 2 times daily as needed (affected area of eczema in ear)

## 2023-03-13 NOTE — PLAN OF CARE
Problem: Adult Behavioral Health Plan of Care  Goal: Patient-Specific Goal (Individualization)  Description: Patient will sleep 6 to 8 hours per night  Patient will eat at least 50% of meals  Patient will attend at least 50% of groups  Patient will comply with recommendations of treatment team  Patient will remain medication compliant    03/12/2023 Pt in MHICU due to disorganized behaviors.    Outcome: Progressing     Problem: Suicide Risk  Goal: Absence of Self-Harm  Description: Pt will be free of self harm while on unit.   Outcome: Progressing     Face to face shift report received from Charisse Ocampo completed, pt observed laying in bed in MHICU, appeared to be sleeping.    Patient appeared to be sleeping for approximately 8 hours since 2130 last shift.    Patient had no reported or observed suicidal behavior or self harm this shift.      Patient's morning vitals as follows; Blood Pressure 146/77  Temp 96.8 Pulse 67 Respirations 16 SpO2 96% RA    Face to face report will be communicated to oncoming RN.    Ynes Blackwell RN  3/13/2023  6:58 AM

## 2023-03-13 NOTE — PLAN OF CARE
"  Problem: Adult Behavioral Health Plan of Care  Goal: Patient-Specific Goal (Individualization)  Description: Patient will sleep 6 to 8 hours per night  Patient will eat at least 50% of meals  Patient will attend at least 50% of groups  Patient will comply with recommendations of treatment team  Patient will remain medication compliant    03/13/2023 Patient may wean if behavior is appropriate.  Treatment Team to reassess daily.  Outcome: Progressing   pt in bed at the start of the shift. Pt has been in bed most of the shift. Pt doesn't want to wean out of the MHICU stating that she has too much anxiety to be around people. Pt reports high anxiety, denies pain, SI, HI and hallucinations.   Pt continues to state she is \"all right\" when asked about symptoms. Pt       Problem: Suicide Risk  Goal: Absence of Self-Harm  Description: Pt will be free of self harm while on unit.   Outcome: Progressing   Goal Outcome Evaluation:         Face to face end of shift report communicated to night shift RN. Reported that pt is a risk for suicide.     Kayy Pimentel RN  3/13/2023  6:45 PM                    "

## 2023-03-13 NOTE — DISCHARGE INSTRUCTIONS
"Behavioral Discharge Planning and Instructions    Summary: brought in by PD. Pt states she called the PD because she was arguing with her brother. Pt states that the brother has been receiving texts from pt that state she feels she would be \"better off not being here\".     Main Diagnosis: Unspecified Psychotic Disorder, Methamphetamine Use Disorder, Severe, Methamphetamine Withdrawal,   Medication Non-adherence, Suicidal Ideation    Health Care Follow-up:     [facility]  [address]  [place]  [address]  24-Hour Hotline/Shelter Line:  [phone]    [facility]  [address] [place] [address] [phone]    [facility]  [address] [place] [address] [phone]    Attend all scheduled appointments with your outpatient providers. Call at least 24 hours in advance if you need to reschedule an appointment to ensure continued access to your outpatient providers.     Major Treatments, Procedures and Findings:  You were provided with: a psychiatric assessment, assessed for medical stability, medication evaluation and/or management, group therapy, family therapy, individual therapy, CD evaluation/assessment, milieu management, and medical interventions    Symptoms to Report: feeling more aggressive, increased confusion, losing more sleep, mood getting worse, or thoughts of suicide    Early warning signs can include: increased depression or anxiety sleep disturbances increased thoughts or behaviors of suicide or self-harm  increased unusual thinking, such as paranoia or hearing voices    Safety and Wellness:  Take all medicines as directed.  Make no changes unless your doctor suggests them.      Follow treatment recommendations.  Refrain from alcohol and non-prescribed drugs.  Ask your support system to help you reduce your access to items that could harm yourself or others. Items could include:  Firearms  Medicines (both prescribed and " "over-the-counter)  Knives and other sharp objects  Ropes and like materials  Car keys  If there is a concern for safety, call 911. If there is a concern for safety, call 911.    Resources:   Crisis Intervention: 447.823.6930 or 003-437-7775 (TTY: 792.577.5236).  Call anytime for help.  National [place] on Mental Illness (www.[place].juan.org): 915.268.9657 or 129-439-3442.  [place] Association for Children's Mental Health (www.macmh.org): 927.434.9360.  Alcoholics Anonymous (www.alcoholics-anonymous.org): Check your phone book for your local chapter.  Suicide Awareness Voices of Education (SAVE) (www.save.org): 083-310-OKOS (6577)  National Suicide Prevention Line (www.mentalhealthmn.org): 339-235-AUDG (9946)  Mental Health Consumer/Survivor Network of [place] (www.mhcsn.net): 616.647.4834 or 846-272-4059  Mental Health Association of [place] (www.mentalhealth.org): 697.930.7754 or 499-366-1244  Self- Management and Recovery Training., [employer]-- Toll free: 620.888.7102  www.[employer].Bring Light  Text 4 Life: txt \"LIFE\" to [address] for immediate support and crisis intervention  Crisis text line: Text \"[place]\" to 608403. Free, confidential, 24/7.  Crisis Intervention: 326.265.6415 or 933-972-3987. Call anytime for help.     General Medication Instructions:   See your medication sheet(s) for instructions.   Take all medicines as directed.  Make no changes unless your doctor suggests them.   Go to all your doctor visits.  Be sure to have all your required lab tests. This way, your medicines can be refilled on time.  Do not use any drugs not prescribed by your doctor.  Avoid alcohol.    Advance Directives:   Scanned document on file with [facility]? No scanned doc  Is document scanned? No. Copy Requested.  Honoring Choices Your Rights Handout: Informed and given  Was more information offered? Pt declined    The Treatment team has appreciated the opportunity to work with you. If you have any questions or concerns about your recent admission, you can " contact the unit which can receive your call 24 hours a day, 7 days a week. They will be able to get in touch with a Provider if needed. The unit number is [phone] .

## 2023-03-14 VITALS
WEIGHT: 255.7 LBS | SYSTOLIC BLOOD PRESSURE: 133 MMHG | BODY MASS INDEX: 41.09 KG/M2 | HEART RATE: 73 BPM | OXYGEN SATURATION: 93 % | TEMPERATURE: 97.8 F | RESPIRATION RATE: 14 BRPM | DIASTOLIC BLOOD PRESSURE: 86 MMHG | HEIGHT: 66 IN

## 2023-03-14 PROCEDURE — 250N000013 HC RX MED GY IP 250 OP 250 PS 637: Performed by: NURSE PRACTITIONER

## 2023-03-14 PROCEDURE — 250N000013 HC RX MED GY IP 250 OP 250 PS 637: Performed by: PSYCHIATRY & NEUROLOGY

## 2023-03-14 PROCEDURE — 99239 HOSP IP/OBS DSCHRG MGMT >30: CPT | Mod: GT | Performed by: PSYCHIATRY & NEUROLOGY

## 2023-03-14 RX ORDER — SERTRALINE HYDROCHLORIDE 100 MG/1
100 TABLET, FILM COATED ORAL DAILY
Status: DISCONTINUED | OUTPATIENT
Start: 2023-03-15 | End: 2023-03-14 | Stop reason: HOSPADM

## 2023-03-14 RX ORDER — ARIPIPRAZOLE 10 MG/1
10 TABLET ORAL DAILY
Qty: 30 TABLET | Refills: 0 | Status: SHIPPED | OUTPATIENT
Start: 2023-03-14 | End: 2023-04-13

## 2023-03-14 RX ORDER — SERTRALINE HYDROCHLORIDE 25 MG/1
25 TABLET, FILM COATED ORAL ONCE
Status: CANCELLED | OUTPATIENT
Start: 2023-03-14

## 2023-03-14 RX ORDER — METOPROLOL SUCCINATE 50 MG/1
50 TABLET, EXTENDED RELEASE ORAL DAILY
Qty: 30 TABLET | Refills: 0 | Status: SHIPPED | OUTPATIENT
Start: 2023-03-14 | End: 2023-04-13

## 2023-03-14 RX ADMIN — SERTRALINE HYDROCHLORIDE 75 MG: 50 TABLET ORAL at 08:10

## 2023-03-14 RX ADMIN — HYDROXYZINE HYDROCHLORIDE 25 MG: 25 TABLET, FILM COATED ORAL at 10:39

## 2023-03-14 RX ADMIN — ARIPIPRAZOLE 10 MG: 10 TABLET ORAL at 08:10

## 2023-03-14 RX ADMIN — METOPROLOL SUCCINATE 50 MG: 50 TABLET, EXTENDED RELEASE ORAL at 08:10

## 2023-03-14 ASSESSMENT — ACTIVITIES OF DAILY LIVING (ADL)
ADLS_ACUITY_SCORE: 28
ADLS_ACUITY_SCORE: 28
DRESS: SCRUBS (BEHAVIORAL HEALTH)
ADLS_ACUITY_SCORE: 28
HYGIENE/GROOMING: INDEPENDENT
ADLS_ACUITY_SCORE: 28
ADLS_ACUITY_SCORE: 28
ORAL_HYGIENE: INDEPENDENT
ADLS_ACUITY_SCORE: 28

## 2023-03-14 NOTE — PLAN OF CARE
Pt is discharging at the recommendation of the treatment team. Pt is discharging to [facility] transported by  [facility]. Pt denies having any thoughts of hurting themself or anyone else. Pt denies anxiety or depression. Pt has follow up with PCP that she will schedule. Discharge instructions, including; demographic sheet, psychiatric evaluation, discharge summary, and AVS were faxed to these next level of care providers.

## 2023-03-14 NOTE — DISCHARGE SUMMARY
[facility] PSYCHIATRY  DISCHARGE SUMMARY     DISCHARGE DATA     Lyssa Yun MRN# [id number]   Age: 48 year old YOB: 1975     Date of Admission: 3/10/2023  Date of Discharge: March 14, 2023  Discharge Provider: Virgil Salinas MD       REASON FOR ADMISSION   Lyssa Yun is a 48 year old female with an unknown past psychiatric history. Appears to have received some of her care through [place] where she has received commitment in the past.  PTA medications of sertraline and aripiprazole, nonadherent secondary to ongoing methamphetamine abuse.  She presents to our emergency department at the recommendation of her brother who she has briefly been living. Reportedly she has been abusing methamphetamine resulting in worsening auditory hallucinations and threatening to end her life. Patient was subsequently transferred and accepted for inpatient psychiatric hospitalization at [facility] for further safety and further stabilization.      DISCHARGE DIAGNOSES   #[place] Depressive Disorder, Recurrent, Severe  #Methamphetamine Use Disorder, Severe  #Methamphetamine Withdrawal  #Medication Non-adherence  #Suicidal Ideation, resolved     HOSPITAL COURSE   Patient was admitted to unit 5 due to the aforementioned presentation. The patient was placed under 15 minute checks to ensure patient safety. The patient participated in unit programming and groups as able.    Legal status during hospitalization was voluntary .Ms. Yun did not require seclusion/restraint during hospitalization.     We reviewed with Ms. Yun current and past medication trials including duration, dose, response and side effects. During this hospitalization, the following changes to the patient's psychotropic medications were made:    PTA medications continued/changed:   -aripiprazole - re-introduced at 5 mg q daily (3/12/23) as she had not been taking 10 mg consistently in outpatient setting ->  increased to 10 mg by day of discharge  -sertraline - re-introduced at 50 mg q daily (3/12/23) as she had not been taking consistently in outpatient setting -> increased to 150 mg q daily by day of discharge     New medications tried and stopped:   -None     New medications initiated:   -none    Ms. Yun progressed slowly at first related to response to medication changes, struggle with acceptance of psychosocial stressors, abstaining from further substance use and psychosocial stressors outside hospital  . By the time of discharge, her symptoms had improved adequately.  Substance cravings improved with increased confidence in self-managing sobriety., Depression improved with increased confidence in ability to manage symptoms. and Suicidal ideation resolved with adequate outpatient plan in place.  The treatment goals (long-term goal/discharge criteria) were reached.     With the aforementioned changes and supports the patient noticed improvement in their symptoms and felt sufficiently ready for discharge. As a result, Lyssa Yun was discharged. At the time of discharge, Lyssa Yun was determined to not be a danger to self or others. The patient was also medically stable for discharge. At the current time of discharge, the patient does not meet criteria for involuntary hospitalization. On the day of discharge, the patient reports that they do not have suicidal or homicidal ideation. Steps taken to minimize risk include: assessing patient s behavior and thought process daily during hospital stay, discharging patient with adequate plan for follow up for mental and physical health and discussing safety plan of returning to the hospital should the patient ever have thoughts of harming themselves or others. Therefore, based on all available evidence including the factors cited above, the patient does not appear to be at imminent risk for self-harm, and is appropriate for outpatient level of care. The acute crisis  is resolved and Lyssa is discharging in improved condition. Though Lyssa's acute crisis has resolved, there remains a higher risk of suicide over the long term compared to the general population. Factors that may be associated with relapse include worsening of depressive symptoms, alcohol use, illicit substance use, not taking medications, lack of mental health follow-up appointments and work/family/relationship stressors. Lyssa Yun has a plan in place to mitigate these stressors, is hopeful about the future ,and is not assessed to be a imminent risk to self or anyone else and thus is not assessed to be holdable.  Lyssa has received maximal benefit from the current hospital stay. Lyssa Yun set to discharge.        DISCHARGE MEDICATIONS     Current Discharge Medication List      CONTINUE these medications which have CHANGED    Details   ARIPiprazole (ABILIFY) 10 MG tablet Take 1 tablet (10 mg) by mouth daily for 30 days  Qty: 30 tablet, Refills: 0    Associated Diagnoses: Severe recurrent major depression without psychotic features (H)      metoprolol succinate ER (TOPROL XL) 50 MG 24 hr tablet Take 1 tablet (50 mg) by mouth daily for 30 days  Qty: 30 tablet, Refills: 0    Associated Diagnoses: Severe recurrent major depression without psychotic features (H)      sertraline (ZOLOFT) 50 MG tablet Take 3 tablets (150 mg) by mouth daily for 30 days  Qty: 90 tablet, Refills: 0    Associated Diagnoses: Severe recurrent major depression without psychotic features (H)         CONTINUE these medications which have NOT CHANGED    Details   SUMAtriptan (IMITREX) 50 MG tablet Take 100 mg by mouth at onset of headache for migraine -Dose may be repeated after 2 hours. Do not exceed 200 mg in 24 hours      triamcinolone (ARISTOCORT HP) 0.5 % external cream Apply topically 2 times daily as needed (affected area of eczema in ear)                VITALS   Vitals: /86   Pulse 73   Temp 97.8  F (36.6  C) (Temporal)   " Resp 14   Ht 1.676 m (5' 6\")   Wt 116 kg (255 lb 11.2 oz)   SpO2 93%   BMI 41.27 kg/m       MENTAL STATUS EXAM   Appearance: Alert, oriented, dressed in hospital scrubs, appears stated age   Attitude: Cooperative   Eye Contact: Good  Mood: \"Better\"  Affect: Full range of affect  Speech: Normal rate and rhythm   Psychomotor Behavior: No tremor, rigidity, or psychomotor abnormality   Thought Process: Logical, goal directed   Associations: No loose associations   Thought Content: Denies SI or plan. No SIB. Denies A/V hallucinations. No evidence of delusional thought.  Insight: Good  Judgment: Good  Oriented to: Person, place, and time  Attention Span and Concentration: Intact  Recent and Remote Memory: Intact  Language: English with appropriate syntax and vocabulary  Fund of Knowledge: Average  Muscle Strength and Tone: Grossly normal  Gait and Station: Grossly normal       DISCHARGE PLAN     1.  Education given regarding diagnostic and treatment options with risks, benefits and alternatives with adequate verbalization of understanding.  2.  Discharge to homeless shelter. Upon detailed review of risk factors, patient amenable for release.   3.  Continue aforementioned medications and associated medication changes with follow-up by outpatient provider.  4.  Crisis management planning in place.    5.  Nursing and  to review further discharge recommendations.   6.  Active issues: No active medical issues requiring immediate follow-up care.  7.  Patient is being discharged with the following appointments as detailed below:    See avs       DISCHARGE SERVICES PROVIDED     80 minutes spent on discharge services, including:  Final examination of patient.  Review and discussion of hospital stay.  Instructions for continued outpatient care/goals.  Preparation of discharge records.  Preparation of medications refills and new prescriptions.  Preparation of applicable referral forms.        ATTESTATION "   Virgil Salinas MD  [facility]   Psychiatry    Video Visit: Patient has given verbal consent for video visit?: Yes  Type of Service: video visit for mental health treatment  Reason for Video Visit: COVID[place]  Originating Site (patient location): [facility]  Distant Site (provider location): Remote Location  Mode of Communication: Video Conference via [employer]  Time of Service: Date: March 14, 2023 , Start: 12:00 end: 13:00     LABS THIS ADMISSION     Results for orders placed or performed during the hospital encounter of 03/10/23   Comprehensive metabolic panel     Status: Abnormal   Result Value Ref Range    Sodium 139 136 - 145 mmol/L    Potassium 3.9 3.4 - 5.3 mmol/L    Chloride 103 98 - 107 mmol/L    Carbon Dioxide (CO2) 21 (L) 22 - 29 mmol/L    Anion Gap 15 7 - 15 mmol/L    Urea Nitrogen 18.7 6.0 - 20.0 mg/dL    Creatinine 0.76 0.51 - 0.95 mg/dL    Calcium 9.9 8.6 - 10.0 mg/dL    Glucose 103 (H) 70 - 99 mg/dL    Alkaline Phosphatase 111 (H) 35 - 104 U/L    AST 21 10 - 35 U/L    ALT 17 10 - 35 U/L    Protein Total 6.7 6.4 - 8.3 g/dL    Albumin 3.8 3.5 - 5.2 g/dL    Bilirubin Total 0.2 <=1.2 mg/dL    GFR Estimate >90 >60 mL/min/1.73m2   Ethyl Alcohol Level     Status: Normal   Result Value Ref Range    Alcohol ethyl <0.01 <=0.01 g/dL   Asymptomatic COVID-19 Virus (Coronavirus) by PCR Nasopharyngeal     Status: Normal    Specimen: Nasopharyngeal; Swab   Result Value Ref Range    SARS CoV2 PCR Negative Negative    Narrative    Testing was performed using the Xpert Xpress SARS-CoV-2 Assay on the Cepheid Gene-Xpert Instrument Systems. Additional information about this Emergency Use Authorization (EUA) assay can be found via the Lab Guide. This test should be ordered for the detection of SARS-CoV-2 in individuals who meet SARS-CoV-2 clinical and/or epidemiological criteria as well as from individuals without symptoms or other reasons to suspect COVID-19. Test  performance for asymptomatic patients has only been established in anterior nasal swab specimens. This test is for in vitro diagnostic use under the FDA EUA for laboratories certified under CLIA to perform high complexity testing. This test has not been FDA cleared or approved. A negative result does not rule out the presence of PCR inhibitors in the specimen or target RNA concentration below the limit of detection for the assay. The possibility of a false negative should be considered if the patient's recent exposure or clinical presentation suggests COVID-19. This test was validated by [facility] laboratory. This laboratory is certified under the Clinical Laboratory Improvement Amendments (CLIA) as qualified to perform high complexity testing.   CBC with platelets and differential     Status: Abnormal   Result Value Ref Range    WBC Count 14.4 (H) 4.0 - 11.0 10e3/uL    RBC Count 5.30 (H) 3.80 - 5.20 10e6/uL    Hemoglobin 15.5 11.7 - 15.7 g/dL    Hematocrit 46.4 35.0 - 47.0 %    MCV 88 78 - 100 fL    MCH 29.2 26.5 - 33.0 pg    MCHC 33.4 31.5 - 36.5 g/dL    RDW 12.7 10.0 - 15.0 %    Platelet Count 389 150 - 450 10e3/uL    % Neutrophils 66 %    % Lymphocytes 24 %    % Monocytes 7 %    % Eosinophils 3 %    % Basophils 0 %    % Immature Granulocytes 0 %    NRBCs per 100 WBC 0 <1 /100    Absolute Neutrophils 9.5 (H) 1.6 - 8.3 10e3/uL    Absolute Lymphocytes 3.4 0.8 - 5.3 10e3/uL    Absolute Monocytes 1.0 0.0 - 1.3 10e3/uL    Absolute Eosinophils 0.4 0.0 - 0.7 10e3/uL    Absolute Basophils 0.1 0.0 - 0.2 10e3/uL    Absolute Immature Granulocytes 0.1 <=0.4 10e3/uL    Absolute NRBCs 0.0 10e3/uL   Extra Tube     Status: None    Narrative    The following orders were created for panel order Extra Tube.  Procedure                               Abnormality         Status                     ---------                               -----------         ------                     Extra Blue Top Tube[[id number]]                               Final result               Extra Red Top Tube[[id number]]                               Final result               Extra Heparinized Syringe[[id number]]                        Final result                 Please view results for these tests on the individual orders.   Extra Blue Top Tube     Status: None   Result Value Ref Range    Hold Specimen JIC    Extra Red Top Tube     Status: None   Result Value Ref Range    Hold Specimen JIC    Extra Heparinized Syringe     Status: None   Result Value Ref Range    Hold Specimen JI    Urine Drugs of Abuse Screen Panel 13     Status: Abnormal   Result Value Ref Range    Cannabinoids (87-qwy-4-carboxy-9-THC) Not Detected Not Detected, Indeterminate    Phencyclidine Not Detected Not Detected, Indeterminate    Cocaine (Benzoylecgonine) Not Detected Not Detected, Indeterminate    Methamphetamine (d-Methamphetamine) Detected (A) Not Detected, Indeterminate    Opiates (Morphine) Not Detected Not Detected, Indeterminate    Amphetamine (d-Amphetamine) Not Detected Not Detected, Indeterminate    Benzodiazepines (Nordiazepam) Not Detected Not Detected, Indeterminate    Tricyclic Antidepressants (Desipramine) Not Detected Not Detected, Indeterminate    Methadone Not Detected Not Detected, Indeterminate    Barbiturates (Butalbital) Not Detected Not Detected, Indeterminate    Oxycodone Not Detected Not Detected, Indeterminate    Propoxyphene (Norpropoxyphene) Not Detected Not Detected, Indeterminate    Buprenorphine Not Detected Not Detected, Indeterminate   CBC with Platelets & Differential     Status: Abnormal    Narrative    The following orders were created for panel order CBC with Platelets & Differential.  Procedure                               Abnormality         Status                     ---------                               -----------         ------                     CBC with platelets and d...[[id number]]  Abnormal            Final result                  Please view results for these tests on the individual orders.   Urine Drugs of Abuse Screen     Status: Abnormal    Narrative    The following orders were created for panel order Urine Drugs of Abuse Screen.  Procedure                               Abnormality         Status                     ---------                               -----------         ------                     Urine Drugs of Abuse Scr...[[id number]]  Abnormal            Final result                 Please view results for these tests on the individual orders.

## 2023-03-14 NOTE — PLAN OF CARE
Problem: Adult Behavioral Health Plan of Care  Goal: Patient-Specific Goal (Individualization)  Description: Patient will sleep 6 to 8 hours per night  Patient will eat at least 50% of meals  Patient will attend at least 50% of groups  Patient will comply with recommendations of treatment team  Patient will remain medication compliant    03/13/2023 Patient may wean if behavior is appropriate.  Treatment Team to reassess daily.  Outcome: Progressing  Note: Report received from Kanchan. Rounding complete. Pt observed sleeping in right side lying position with regular and unlabored respirations.    Pt has been in bed with eyes closed and regular respirations. 15 minute and PRN checks all night. No complaints offered. Will continue to monitor.    Pt slept approx 7.5   hours this NOC shift.    Face to face end of shift report communicated to oncoming RN.    Lucrecia AGUILLON RN  March 14, 2023  4:19 AM          Problem: Suicide Risk  Goal: Absence of Self-Harm  Description: Pt will be free of self harm while on unit.   Outcome: Progressing  Note: Unable to assess due to pt sleeping. Pt has remained free of self-harm.     Goal Outcome Evaluation:

## 2023-03-14 NOTE — PLAN OF CARE
Discharge Note    Patient Discharged to homeless shelter [facility] on 3/14/2023 3:34 PM via [facility] accompanied by Unit Assistant walks pt to pharmacy for medications and then to taxi..     Patient informed of discharge instructions in AVS. patient verbalizes understanding and denies having any questions pertaining to AVS. Patient stable at time of discharge. Patient denies SI, HI, and thoughts of self harm at time of discharge. All personal belongings returned to patient. Discharge prescriptions sent to [facility] via electronic communication.       Ynes Mcfadden RN  3/14/2023  2:41 PM      Problem: Adult Behavioral Health Plan of Care  Goal: Patient-Specific Goal (Individualization)  Description: Patient will sleep 6 to 8 hours per night  Patient will eat at least 50% of meals  Patient will attend at least 50% of groups  Patient will comply with recommendations of treatment team  Patient will remain medication compliant    03/14/2023 Patient may wean if behavior is appropriate.  Treatment Team to reassess daily.  Outcome:Met  Sleeping this morning -wakes for breakfast. Affect bright an mood improved.   Cooperative with medication-no sx reported-(pta medication restarted.) pt verbalizes desire to discharge---Denies all criteria including: SI, SIB, HI or hallucinations. Rests much of shift-weans to open unit to talk with SW and to get a friends phone number from her tablet.  Lets needs be known.       Problem: Suicide Risk  Goal: Absence of Self-Harm  Description: Pt will be free of self harm while on unit.   Outcome: Met   Goal Outcome Evaluation:    Plan of Care Reviewed With: patient        Face to face end of shift report communicated to oncoming shift.     Ynes Mcfadden RN  3/14/2023  1:43 PM

## 2023-03-20 ENCOUNTER — TELEPHONE (OUTPATIENT)
Dept: BEHAVIORAL HEALTH | Facility: HOSPITAL | Age: 48
End: 2023-03-20

## 2023-03-20 NOTE — TELEPHONE ENCOUNTER
She was discharged to home on  3/14/23 from [facility]. I spoke today with her regarding the patient's discharge.    She indicates she has received sufficient information upon discharge. Medications were reviewed in full on discharge, including: Medications to be started; medications to be stopped; medications to be continued from preadmission and any side effects.   Prescriptions were e-scribed at discharge and were able to be filled. Yes  If unable to obtain prescriptions: explain: N/A    Medications are being taken as prescribed.    She did not have any questions regarding discharge instructions or condition.